# Patient Record
Sex: MALE | Race: WHITE | Employment: OTHER | URBAN - METROPOLITAN AREA
[De-identification: names, ages, dates, MRNs, and addresses within clinical notes are randomized per-mention and may not be internally consistent; named-entity substitution may affect disease eponyms.]

---

## 2017-03-10 RX ORDER — CLARITHROMYCIN 500 MG/1
500 TABLET, COATED ORAL 2 TIMES DAILY
Qty: 14 TABLET | Refills: 0 | Status: SHIPPED | OUTPATIENT
Start: 2017-03-10 | End: 2017-03-24

## 2017-03-15 RX ORDER — MELOXICAM 15 MG/1
TABLET ORAL
Qty: 30 TABLET | Refills: 2 | Status: SHIPPED | OUTPATIENT
Start: 2017-03-15 | End: 2020-01-29

## 2017-04-01 RX ORDER — VENLAFAXINE 75 MG/1
TABLET ORAL
Qty: 90 TABLET | Refills: 4 | Status: SHIPPED | OUTPATIENT
Start: 2017-04-01 | End: 2018-07-15 | Stop reason: SDUPTHER

## 2017-06-05 RX ORDER — HYDROCHLOROTHIAZIDE 12.5 MG/1
CAPSULE, GELATIN COATED ORAL
Qty: 60 CAPSULE | Refills: 2 | Status: SHIPPED | OUTPATIENT
Start: 2017-06-05 | End: 2017-12-04 | Stop reason: SDUPTHER

## 2017-12-04 RX ORDER — HYDROCHLOROTHIAZIDE 12.5 MG/1
CAPSULE, GELATIN COATED ORAL
Qty: 60 CAPSULE | Refills: 1 | Status: SHIPPED | OUTPATIENT
Start: 2017-12-04 | End: 2018-04-07 | Stop reason: SDUPTHER

## 2017-12-16 RX ORDER — QUINAPRIL 40 MG/1
TABLET ORAL
Qty: 30 TABLET | Refills: 4 | Status: SHIPPED | OUTPATIENT
Start: 2017-12-16 | End: 2018-05-16 | Stop reason: SDUPTHER

## 2017-12-28 DIAGNOSIS — I10 ESSENTIAL HYPERTENSION: ICD-10-CM

## 2017-12-28 DIAGNOSIS — E78.2 MIXED HYPERLIPIDEMIA: Primary | Chronic | ICD-10-CM

## 2018-01-16 DIAGNOSIS — E78.2 MIXED HYPERLIPIDEMIA: Chronic | ICD-10-CM

## 2018-01-16 DIAGNOSIS — I10 ESSENTIAL HYPERTENSION: ICD-10-CM

## 2018-01-16 LAB
A/G RATIO: 2.1 (ref 1.1–2.2)
ALBUMIN SERPL-MCNC: 5.1 G/DL (ref 3.4–5)
ALP BLD-CCNC: 60 U/L (ref 40–129)
ALT SERPL-CCNC: 26 U/L (ref 10–40)
ANION GAP SERPL CALCULATED.3IONS-SCNC: 14 MMOL/L (ref 3–16)
AST SERPL-CCNC: 33 U/L (ref 15–37)
BASOPHILS ABSOLUTE: 0 K/UL (ref 0–0.2)
BASOPHILS RELATIVE PERCENT: 0.8 %
BILIRUB SERPL-MCNC: 1.1 MG/DL (ref 0–1)
BUN BLDV-MCNC: 13 MG/DL (ref 7–20)
CALCIUM SERPL-MCNC: 9.8 MG/DL (ref 8.3–10.6)
CHLORIDE BLD-SCNC: 98 MMOL/L (ref 99–110)
CHOLESTEROL, TOTAL: 257 MG/DL (ref 0–199)
CO2: 27 MMOL/L (ref 21–32)
CREAT SERPL-MCNC: 0.9 MG/DL (ref 0.8–1.3)
EOSINOPHILS ABSOLUTE: 0.2 K/UL (ref 0–0.6)
EOSINOPHILS RELATIVE PERCENT: 4.2 %
GFR AFRICAN AMERICAN: >60
GFR NON-AFRICAN AMERICAN: >60
GLOBULIN: 2.4 G/DL
GLUCOSE BLD-MCNC: 94 MG/DL (ref 70–99)
HCT VFR BLD CALC: 51.6 % (ref 40.5–52.5)
HDLC SERPL-MCNC: 62 MG/DL (ref 40–60)
HEMOGLOBIN: 17.6 G/DL (ref 13.5–17.5)
LDL CHOLESTEROL CALCULATED: 173 MG/DL
LYMPHOCYTES ABSOLUTE: 1.2 K/UL (ref 1–5.1)
LYMPHOCYTES RELATIVE PERCENT: 24.1 %
MCH RBC QN AUTO: 32.9 PG (ref 26–34)
MCHC RBC AUTO-ENTMCNC: 34.1 G/DL (ref 31–36)
MCV RBC AUTO: 96.4 FL (ref 80–100)
MONOCYTES ABSOLUTE: 0.6 K/UL (ref 0–1.3)
MONOCYTES RELATIVE PERCENT: 12.2 %
NEUTROPHILS ABSOLUTE: 3 K/UL (ref 1.7–7.7)
NEUTROPHILS RELATIVE PERCENT: 58.7 %
PDW BLD-RTO: 13.8 % (ref 12.4–15.4)
PLATELET # BLD: 137 K/UL (ref 135–450)
PMV BLD AUTO: 9.9 FL (ref 5–10.5)
POTASSIUM SERPL-SCNC: 4.2 MMOL/L (ref 3.5–5.1)
RBC # BLD: 5.36 M/UL (ref 4.2–5.9)
SODIUM BLD-SCNC: 139 MMOL/L (ref 136–145)
TOTAL CK: 240 U/L (ref 39–308)
TOTAL PROTEIN: 7.5 G/DL (ref 6.4–8.2)
TRIGL SERPL-MCNC: 110 MG/DL (ref 0–150)
VLDLC SERPL CALC-MCNC: 22 MG/DL
WBC # BLD: 5.1 K/UL (ref 4–11)

## 2018-02-13 RX ORDER — ATORVASTATIN CALCIUM 40 MG/1
TABLET, FILM COATED ORAL
Qty: 30 TABLET | Refills: 4 | Status: SHIPPED | OUTPATIENT
Start: 2018-02-13 | End: 2020-01-29

## 2018-03-02 ENCOUNTER — OFFICE VISIT (OUTPATIENT)
Dept: FAMILY MEDICINE CLINIC | Age: 63
End: 2018-03-02

## 2018-03-02 VITALS
BODY MASS INDEX: 37.53 KG/M2 | OXYGEN SATURATION: 97 % | HEART RATE: 90 BPM | DIASTOLIC BLOOD PRESSURE: 80 MMHG | SYSTOLIC BLOOD PRESSURE: 120 MMHG | WEIGHT: 292.4 LBS | HEIGHT: 74 IN

## 2018-03-02 DIAGNOSIS — E78.2 MIXED HYPERLIPIDEMIA: Chronic | ICD-10-CM

## 2018-03-02 DIAGNOSIS — I10 ESSENTIAL HYPERTENSION: ICD-10-CM

## 2018-03-02 DIAGNOSIS — M17.0 PRIMARY OSTEOARTHRITIS OF BOTH KNEES: ICD-10-CM

## 2018-03-02 DIAGNOSIS — Z01.818 PREOP EXAMINATION: Primary | ICD-10-CM

## 2018-03-02 PROCEDURE — 99214 OFFICE O/P EST MOD 30 MIN: CPT | Performed by: FAMILY MEDICINE

## 2018-03-02 PROCEDURE — 93000 ELECTROCARDIOGRAM COMPLETE: CPT | Performed by: FAMILY MEDICINE

## 2018-03-02 ASSESSMENT — PATIENT HEALTH QUESTIONNAIRE - PHQ9
SUM OF ALL RESPONSES TO PHQ QUESTIONS 1-9: 0
1. LITTLE INTEREST OR PLEASURE IN DOING THINGS: 0
2. FEELING DOWN, DEPRESSED OR HOPELESS: 0
SUM OF ALL RESPONSES TO PHQ9 QUESTIONS 1 & 2: 0

## 2018-03-02 NOTE — PROGRESS NOTES
Occupational History    [employer]      Social History Main Topics    Smoking status: Former Smoker     Quit date: 1/1/1996    Smokeless tobacco: Never Used    Alcohol use 25.2 oz/week     42 Cans of beer per week    Drug use: Unknown    Sexual activity: Yes     Partners: Female     Other Topics Concern    None     Social History Narrative    Happily  and 1 kid at home    Works 32/w    Stress at [employer] to be gone) and home    No exercise     Current Outpatient Prescriptions   Medication Sig Dispense Refill    atorvastatin (LIPITOR) 40 MG tablet TAKE ONE TABLET BY MOUTH DAILY 30 tablet 4    quinapril (ACCUPRIL) 40 MG tablet TAKE ONE TABLET BY MOUTH DAILY 30 tablet 4    hydrochlorothiazide (MICROZIDE) 12.5 MG capsule TAKE ONE CAPSULE BY MOUTH TWICE A DAY 60 capsule 1    venlafaxine (EFFEXOR) 75 MG tablet TAKE ONE TABLET BY MOUTH THREE TIMES A DAY 90 tablet 4    meloxicam (MOBIC) 15 MG tablet TAKE ONE TABLET BY MOUTH DAILY 30 tablet 2    KLOR-CON M20 20 MEQ tablet TAKE ONE TABLET BY MOUTH DAILY 90 tablet 2     No current facility-administered medications for this visit. Current Outpatient Prescriptions on File Prior to Visit   Medication Sig Dispense Refill    atorvastatin (LIPITOR) 40 MG tablet TAKE ONE TABLET BY MOUTH DAILY 30 tablet 4    quinapril (ACCUPRIL) 40 MG tablet TAKE ONE TABLET BY MOUTH DAILY 30 tablet 4    hydrochlorothiazide (MICROZIDE) 12.5 MG capsule TAKE ONE CAPSULE BY MOUTH TWICE A DAY 60 capsule 1    venlafaxine (EFFEXOR) 75 MG tablet TAKE ONE TABLET BY MOUTH THREE TIMES A DAY 90 tablet 4    meloxicam (MOBIC) 15 MG tablet TAKE ONE TABLET BY MOUTH DAILY 30 tablet 2    KLOR-CON M20 20 MEQ tablet TAKE ONE TABLET BY MOUTH DAILY 90 tablet 2     No current facility-administered medications on file prior to visit.       Allergies   Allergen Reactions    Vicodin [Hydrocodone-Acetaminophen] Itching     Creepy crawly sensation     Review of Systems  Constitutional: negative  Eyes: negative  Ears, nose, mouth, throat, and face: negative  Respiratory: negative  Cardiovascular: negative  Gastrointestinal: negative  Genitourinary:negative  Integument/breast: negative  Hematologic/lymphatic: negative  Musculoskeletal:positive for arthralgias  Neurological: negative  Behavioral/Psych: negative  Endocrine: negative  Allergic/Immunologic: negative      Objective:      /80   Pulse 90   Ht 6' 2\" (1.88 m)   Wt 292 lb 6.4 oz (132.6 kg)   SpO2 97%   BMI 37.54 kg/m²     General Appearance:  Alert, cooperative, no distress, appears stated age   Head:  Normocephalic, without obvious abnormality, atraumatic   Eyes:  PERRL, conjunctiva/corneas clear, EOM's intact, fundi benign, both eyes   Ears:  Normal TM's and external ear canals, both ears   Nose: Nares normal, septum midline, mucosa normal, no drainage or sinus tenderness   Throat: Lips, mucosa, and tongue normal; teeth and gums normal   Neck: Supple, symmetrical, trachea midline, no adenopathy, thyroid: not enlarged, symmetric, no tenderness/mass/nodules, no carotid bruit or JVD   Back:   Symmetric, no curvature, ROM normal, no CVA tenderness   Lungs:   Clear to auscultation bilaterally, respirations unlabored   Chest Wall:  No tenderness or deformity   Heart:  Regular rate and rhythm, S1, S2 normal, no murmur, rub or gallop   Abdomen:   Soft, non-tender, bowel sounds active all four quadrants,  no masses, no organomegaly   Genitalia:  Normal male   Rectal:  Normal tone, normal prostate, no masses or tenderness;  guaiac negative stool   Extremities: Extremities normal, atraumatic, no cyanosis or edema   Pulses: 2+ and symmetric   Skin: Skin color, texture, turgor normal, no rashes or lesions   Lymph nodes: Cervical, supraclavicular, and axillary nodes normal   Neurologic: Normal         Predictors of intubation difficulty:   Morbid obesity? no   Anatomically abnormal facies? no   Prominent incisors? no   Receding mandible?  no

## 2018-03-12 ENCOUNTER — TELEPHONE (OUTPATIENT)
Dept: FAMILY MEDICINE CLINIC | Age: 63
End: 2018-03-12

## 2018-03-12 DIAGNOSIS — G47.30 SLEEP APNEA, UNSPECIFIED TYPE: Primary | ICD-10-CM

## 2018-04-09 RX ORDER — HYDROCHLOROTHIAZIDE 12.5 MG/1
CAPSULE, GELATIN COATED ORAL
Qty: 60 CAPSULE | Refills: 0 | Status: SHIPPED | OUTPATIENT
Start: 2018-04-09 | End: 2018-06-09 | Stop reason: SDUPTHER

## 2018-05-17 RX ORDER — QUINAPRIL 40 MG/1
TABLET ORAL
Qty: 30 TABLET | Refills: 3 | Status: SHIPPED | OUTPATIENT
Start: 2018-05-17 | End: 2018-09-16 | Stop reason: SDUPTHER

## 2018-06-09 RX ORDER — HYDROCHLOROTHIAZIDE 12.5 MG/1
CAPSULE, GELATIN COATED ORAL
Qty: 60 CAPSULE | Refills: 0 | Status: SHIPPED | OUTPATIENT
Start: 2018-06-09 | End: 2018-08-12 | Stop reason: SDUPTHER

## 2018-07-15 RX ORDER — VENLAFAXINE 75 MG/1
TABLET ORAL
Qty: 90 TABLET | Refills: 3 | Status: SHIPPED | OUTPATIENT
Start: 2018-07-15 | End: 2019-07-17 | Stop reason: SDUPTHER

## 2018-09-17 RX ORDER — QUINAPRIL 40 MG/1
TABLET ORAL
Qty: 30 TABLET | Refills: 2 | Status: SHIPPED | OUTPATIENT
Start: 2018-09-17 | End: 2018-12-15 | Stop reason: SDUPTHER

## 2018-12-05 ENCOUNTER — TELEPHONE (OUTPATIENT)
Dept: FAMILY MEDICINE CLINIC | Age: 63
End: 2018-12-05

## 2018-12-05 DIAGNOSIS — Z12.11 COLON CANCER SCREENING: Primary | ICD-10-CM

## 2018-12-06 ENCOUNTER — TELEPHONE (OUTPATIENT)
Dept: FAMILY MEDICINE CLINIC | Age: 63
End: 2018-12-06

## 2018-12-06 DIAGNOSIS — Z12.11 SCREENING FOR COLON CANCER: Primary | ICD-10-CM

## 2018-12-10 ENCOUNTER — OFFICE VISIT (OUTPATIENT)
Dept: FAMILY MEDICINE CLINIC | Age: 63
End: 2018-12-10
Payer: COMMERCIAL

## 2018-12-10 VITALS
OXYGEN SATURATION: 97 % | WEIGHT: 291 LBS | RESPIRATION RATE: 16 BRPM | HEIGHT: 74 IN | HEART RATE: 74 BPM | DIASTOLIC BLOOD PRESSURE: 80 MMHG | BODY MASS INDEX: 37.35 KG/M2 | SYSTOLIC BLOOD PRESSURE: 144 MMHG

## 2018-12-10 DIAGNOSIS — Z23 NEEDS FLU SHOT: Primary | ICD-10-CM

## 2018-12-10 DIAGNOSIS — N28.1 RENAL CYST: ICD-10-CM

## 2018-12-10 PROCEDURE — 99213 OFFICE O/P EST LOW 20 MIN: CPT | Performed by: FAMILY MEDICINE

## 2018-12-10 ASSESSMENT — ENCOUNTER SYMPTOMS
ABDOMINAL PAIN: 0
BOWEL INCONTINENCE: 0

## 2018-12-10 NOTE — PROGRESS NOTES
Subjective:     Patient ID:Kyle Landaverde is a 61 y.o. male. Flank Pain   This is a recurrent problem. The current episode started more than 1 year ago. The problem occurs intermittently. The problem is unchanged. The quality of the pain is described as aching. The pain is mild. The pain is worse during the day. The symptoms are aggravated by bending. Pertinent negatives include no abdominal pain, bladder incontinence, bowel incontinence, chest pain, dysuria, fever, headaches, leg pain, numbness, paresis, paresthesias, pelvic pain, perianal numbness, tingling, weakness or weight loss. Risk factors include sedentary lifestyle (renal cysts seen). He has tried nothing for the symptoms. The treatment provided moderate relief. Allergies   Allergen Reactions    Vicodin [Hydrocodone-Acetaminophen] Itching     Creepy crawly sensation       Current Outpatient Prescriptions   Medication Sig Dispense Refill    quinapril (ACCUPRIL) 40 MG tablet TAKE ONE TABLET BY MOUTH DAILY 30 tablet 2    hydrochlorothiazide (MICROZIDE) 12.5 MG capsule TAKE ONE CAPSULE BY MOUTH TWICE A DAY 60 capsule 3    venlafaxine (EFFEXOR) 75 MG tablet TAKE ONE TABLET BY MOUTH THREE TIMES A DAY 90 tablet 3    atorvastatin (LIPITOR) 40 MG tablet TAKE ONE TABLET BY MOUTH DAILY 30 tablet 4    meloxicam (MOBIC) 15 MG tablet TAKE ONE TABLET BY MOUTH DAILY 30 tablet 2    KLOR-CON M20 20 MEQ tablet TAKE ONE TABLET BY MOUTH DAILY 90 tablet 2     No current facility-administered medications for this visit. Past Medical History:   Diagnosis Date    Abnormal liver enzymes     Anxiety     followed by psych    Chronic back pain     Depression     followed by psych    Gilbert's syndrome     Hyperlipidemia 10/12/2010    Hypertension 10/12/2010    Unspecified sleep apnea     using mask.      Vitamin D deficiency disease        Past Surgical History:   Procedure Laterality Date    APPENDECTOMY      COLONOSCOPY  2006    Q10    ELBOW

## 2018-12-11 ENCOUNTER — HOSPITAL ENCOUNTER (OUTPATIENT)
Dept: ULTRASOUND IMAGING | Age: 63
Discharge: HOME | End: 2018-12-11
Payer: COMMERCIAL

## 2018-12-11 DIAGNOSIS — N28.1 RENAL CYST: ICD-10-CM

## 2018-12-11 PROCEDURE — 76770 US EXAM ABDO BACK WALL COMP: CPT

## 2018-12-16 RX ORDER — QUINAPRIL 40 MG/1
TABLET ORAL
Qty: 30 TABLET | Refills: 1 | Status: SHIPPED | OUTPATIENT
Start: 2018-12-16 | End: 2019-02-13 | Stop reason: SDUPTHER

## 2019-02-13 RX ORDER — QUINAPRIL 40 MG/1
TABLET ORAL
Qty: 30 TABLET | Refills: 0 | Status: SHIPPED | OUTPATIENT
Start: 2019-02-13 | End: 2019-03-18 | Stop reason: SDUPTHER

## 2019-03-18 RX ORDER — QUINAPRIL 40 MG/1
TABLET ORAL
Qty: 30 TABLET | Refills: 5 | Status: SHIPPED | OUTPATIENT
Start: 2019-03-18 | End: 2019-09-16 | Stop reason: SDUPTHER

## 2019-04-14 RX ORDER — HYDROCHLOROTHIAZIDE 12.5 MG/1
CAPSULE, GELATIN COATED ORAL
Qty: 60 CAPSULE | Refills: 2 | Status: SHIPPED | OUTPATIENT
Start: 2019-04-14 | End: 2019-10-11 | Stop reason: SDUPTHER

## 2019-07-17 RX ORDER — VENLAFAXINE 75 MG/1
TABLET ORAL
Qty: 90 TABLET | Refills: 2 | Status: SHIPPED | OUTPATIENT
Start: 2019-07-17 | End: 2020-04-28

## 2019-09-16 RX ORDER — QUINAPRIL 40 MG/1
TABLET ORAL
Qty: 30 TABLET | Refills: 4 | Status: SHIPPED | OUTPATIENT
Start: 2019-09-16 | End: 2020-02-10

## 2019-10-11 RX ORDER — HYDROCHLOROTHIAZIDE 12.5 MG/1
CAPSULE, GELATIN COATED ORAL
Qty: 60 CAPSULE | Refills: 1 | Status: SHIPPED | OUTPATIENT
Start: 2019-10-11 | End: 2020-01-06

## 2020-01-06 RX ORDER — HYDROCHLOROTHIAZIDE 12.5 MG/1
CAPSULE, GELATIN COATED ORAL
Qty: 60 CAPSULE | Refills: 0 | Status: SHIPPED | OUTPATIENT
Start: 2020-01-06 | End: 2020-02-05

## 2020-01-29 ENCOUNTER — OFFICE VISIT (OUTPATIENT)
Dept: FAMILY MEDICINE CLINIC | Age: 65
End: 2020-01-29
Payer: COMMERCIAL

## 2020-01-29 ENCOUNTER — HOSPITAL ENCOUNTER (OUTPATIENT)
Age: 65
Discharge: HOME | End: 2020-01-29
Payer: COMMERCIAL

## 2020-01-29 ENCOUNTER — HOSPITAL ENCOUNTER (OUTPATIENT)
Dept: GENERAL RADIOLOGY | Age: 65
Discharge: HOME | End: 2020-01-29
Payer: COMMERCIAL

## 2020-01-29 VITALS
DIASTOLIC BLOOD PRESSURE: 104 MMHG | TEMPERATURE: 98.8 F | HEART RATE: 75 BPM | HEIGHT: 75 IN | WEIGHT: 287 LBS | OXYGEN SATURATION: 98 % | BODY MASS INDEX: 35.68 KG/M2 | SYSTOLIC BLOOD PRESSURE: 150 MMHG

## 2020-01-29 PROCEDURE — 99213 OFFICE O/P EST LOW 20 MIN: CPT | Performed by: FAMILY MEDICINE

## 2020-01-29 PROCEDURE — G8484 FLU IMMUNIZE NO ADMIN: HCPCS | Performed by: FAMILY MEDICINE

## 2020-01-29 PROCEDURE — G8427 DOCREV CUR MEDS BY ELIG CLIN: HCPCS | Performed by: FAMILY MEDICINE

## 2020-01-29 PROCEDURE — 4004F PT TOBACCO SCREEN RCVD TLK: CPT | Performed by: FAMILY MEDICINE

## 2020-01-29 PROCEDURE — G8417 CALC BMI ABV UP PARAM F/U: HCPCS | Performed by: FAMILY MEDICINE

## 2020-01-29 PROCEDURE — 71046 X-RAY EXAM CHEST 2 VIEWS: CPT

## 2020-01-29 PROCEDURE — 3017F COLORECTAL CA SCREEN DOC REV: CPT | Performed by: FAMILY MEDICINE

## 2020-01-29 ASSESSMENT — ENCOUNTER SYMPTOMS
BLURRED VISION: 0
ABDOMINAL PAIN: 0
ORTHOPNEA: 0
SHORTNESS OF BREATH: 0
BOWEL INCONTINENCE: 0

## 2020-01-29 NOTE — PROGRESS NOTES
TAKE ONE TABLET BY MOUTH DAILY 30 tablet 4    venlafaxine (EFFEXOR) 75 MG tablet TAKE ONE TABLET BY MOUTH THREE TIMES A DAY 90 tablet 2     No current facility-administered medications for this visit. Past Medical History:   Diagnosis Date    Abnormal liver enzymes     Anxiety     followed by psych    Chronic back pain     Depression     followed by psych    Gilbert's syndrome     Hyperlipidemia 10/12/2010    Hypertension 10/12/2010    Unspecified sleep apnea     using mask.  Vitamin D deficiency disease        Past Surgical History:   Procedure Laterality Date    APPENDECTOMY      COLONOSCOPY      Q10    ELBOW SURGERY      reconstruction    HERNIA REPAIR      Hiatal    KNEE ARTHROSCOPY         Social History     Socioeconomic History    Marital status:      Spouse name: Not on file    Number of children: 3    Years of education: Not on file    Highest education level: Not on file   Occupational History    Occupation: [employer]   Social Needs    Financial resource strain: Not on file    Food insecurity:     Worry: Not on file     Inability: Not on file   infirst Healthcare needs:     Medical: Not on file     Non-medical: Not on file   Tobacco Use    Smoking status: Current Every Day Smoker     Packs/day: 0.70     Types: Cigarettes     Last attempt to quit: 1996     Years since quittin.0    Smokeless tobacco: Never Used    Tobacco comment: started smoking 1 1/2 ago, smokes 6-7 cig daily   Substance and Sexual Activity    Alcohol use:  Yes     Alcohol/week: 42.0 standard drinks     Types: 42 Cans of beer per week    Drug use: Yes     Types: Marijuana     Comment: last used yesterday    Sexual activity: Yes     Partners: Female   Lifestyle    Physical activity:     Days per week: Not on file     Minutes per session: Not on file    Stress: Not on file   Relationships    Social connections:     Talks on phone: Not on file     Gets together: Not on file No JVD. Trachea: No tracheal deviation. Cardiovascular:      Rate and Rhythm: Normal rate and regular rhythm. Heart sounds: Normal heart sounds. No murmur. No gallop. Pulmonary:      Effort: Pulmonary effort is normal. No respiratory distress. Breath sounds: Normal breath sounds. No stridor. No wheezing or rales. Chest:      Chest wall: No tenderness. Abdominal:      General: Bowel sounds are normal. There is no distension. Palpations: Abdomen is soft. There is no mass. Tenderness: There is no abdominal tenderness. Musculoskeletal:         General: No tenderness. Lymphadenopathy:      Cervical: No cervical adenopathy. Skin:     General: Skin is warm and dry. Coloration: Skin is not pale. Findings: No erythema or rash. Neurological:      Mental Status: He is alert and oriented to person, place, and time. Cranial Nerves: No cranial nerve deficit. Motor: No abnormal muscle tone. Coordination: Coordination normal.      Deep Tendon Reflexes: Reflexes normal.   Psychiatric:         Behavior: Behavior normal.         Thought Content: Thought content normal.         Judgment: Judgment normal.       No visits with results within 1 Month(s) from this visit.    Latest known visit with results is:   Orders Only on 01/16/2018   Component Date Value Ref Range Status    Sodium 01/16/2018 139  136 - 145 mmol/L Final    Potassium 01/16/2018 4.2  3.5 - 5.1 mmol/L Final    Chloride 01/16/2018 98* 99 - 110 mmol/L Final    CO2 01/16/2018 27  21 - 32 mmol/L Final    Anion Gap 01/16/2018 14  3 - 16 Final    Glucose 01/16/2018 94  70 - 99 mg/dL Final    BUN 01/16/2018 13  7 - 20 mg/dL Final    CREATININE 01/16/2018 0.9  0.8 - 1.3 mg/dL Final    GFR Non- 01/16/2018 >60  >60 Final    GFR  01/16/2018 >60  >60 Final    Calcium 01/16/2018 9.8  8.3 - 10.6 mg/dL Final    Total Protein 01/16/2018 7.5  6.4 - 8.2 g/dL Final    Alb 01/16/2018 5.1* 3.4 - 5.0 g/dL Final    Albumin/Globulin Ratio 01/16/2018 2.1  1.1 - 2.2 Final    Total Bilirubin 01/16/2018 1.1* 0.0 - 1.0 mg/dL Final    Alkaline Phosphatase 01/16/2018 60  40 - 129 U/L Final    ALT 01/16/2018 26  10 - 40 U/L Final    AST 01/16/2018 33  15 - 37 U/L Final    Globulin 01/16/2018 2.4  g/dL Final    WBC 01/16/2018 5.1  4.0 - 11.0 K/uL Final    RBC 01/16/2018 5.36  4.20 - 5.90 M/uL Final    Hemoglobin 01/16/2018 17.6* 13.5 - 17.5 g/dL Final    Hematocrit 01/16/2018 51.6  40.5 - 52.5 % Final    MCV 01/16/2018 96.4  80.0 - 100.0 fL Final    MCH 01/16/2018 32.9  26.0 - 34.0 pg Final    MCHC 01/16/2018 34.1  31.0 - 36.0 g/dL Final    RDW 01/16/2018 13.8  12.4 - 15.4 % Final    Platelets 54/95/0719 137  135 - 450 K/uL Final    MPV 01/16/2018 9.9  5.0 - 10.5 fL Final    Neutrophils % 01/16/2018 58.7  % Final    Lymphocytes % 01/16/2018 24.1  % Final    Monocytes % 01/16/2018 12.2  % Final    Eosinophils % 01/16/2018 4.2  % Final    Basophils % 01/16/2018 0.8  % Final    Neutrophils Absolute 01/16/2018 3.0  1.7 - 7.7 K/uL Final    Lymphocytes Absolute 01/16/2018 1.2  1.0 - 5.1 K/uL Final    Monocytes Absolute 01/16/2018 0.6  0.0 - 1.3 K/uL Final    Eosinophils Absolute 01/16/2018 0.2  0.0 - 0.6 K/uL Final    Basophils Absolute 01/16/2018 0.0  0.0 - 0.2 K/uL Final    Cholesterol, Total 01/16/2018 257* 0 - 199 mg/dL Final    Triglycerides 01/16/2018 110  0 - 150 mg/dL Final    HDL 01/16/2018 62* 40 - 60 mg/dL Final    LDL Calculated 01/16/2018 173* <100 mg/dL Final    VLDL Cholesterol Calculated 01/16/2018 22  Not Established mg/dL Final    Total CK 01/16/2018 240  39 - 308 U/L Final         Assessment and Plan:     Renal cyst  Still has sx--zak recheck U/S    Essential hypertension  Close to goal--watch at home

## 2020-01-30 DIAGNOSIS — I10 ESSENTIAL HYPERTENSION: ICD-10-CM

## 2020-01-30 LAB
A/G RATIO: 2.1 (ref 1.1–2.2)
ALBUMIN SERPL-MCNC: 4.7 G/DL (ref 3.4–5)
ALP BLD-CCNC: 54 U/L (ref 40–129)
ALT SERPL-CCNC: 30 U/L (ref 10–40)
ANION GAP SERPL CALCULATED.3IONS-SCNC: 13 MMOL/L (ref 3–16)
AST SERPL-CCNC: 34 U/L (ref 15–37)
BASOPHILS ABSOLUTE: 0 K/UL (ref 0–0.2)
BASOPHILS RELATIVE PERCENT: 0.7 %
BILIRUB SERPL-MCNC: 1 MG/DL (ref 0–1)
BUN BLDV-MCNC: 11 MG/DL (ref 7–20)
CALCIUM SERPL-MCNC: 9.6 MG/DL (ref 8.3–10.6)
CHLORIDE BLD-SCNC: 103 MMOL/L (ref 99–110)
CHOLESTEROL, TOTAL: 223 MG/DL (ref 0–199)
CO2: 27 MMOL/L (ref 21–32)
CREAT SERPL-MCNC: 0.9 MG/DL (ref 0.8–1.3)
EOSINOPHILS ABSOLUTE: 0.2 K/UL (ref 0–0.6)
EOSINOPHILS RELATIVE PERCENT: 4.4 %
GFR AFRICAN AMERICAN: >60
GFR NON-AFRICAN AMERICAN: >60
GLOBULIN: 2.2 G/DL
GLUCOSE BLD-MCNC: 105 MG/DL (ref 70–99)
HCT VFR BLD CALC: 50.9 % (ref 40.5–52.5)
HDLC SERPL-MCNC: 70 MG/DL (ref 40–60)
HEMOGLOBIN: 17.3 G/DL (ref 13.5–17.5)
LDL CHOLESTEROL CALCULATED: 141 MG/DL
LYMPHOCYTES ABSOLUTE: 1 K/UL (ref 1–5.1)
LYMPHOCYTES RELATIVE PERCENT: 16.9 %
MCH RBC QN AUTO: 32.4 PG (ref 26–34)
MCHC RBC AUTO-ENTMCNC: 34 G/DL (ref 31–36)
MCV RBC AUTO: 95.4 FL (ref 80–100)
MONOCYTES ABSOLUTE: 0.6 K/UL (ref 0–1.3)
MONOCYTES RELATIVE PERCENT: 11 %
NEUTROPHILS ABSOLUTE: 3.8 K/UL (ref 1.7–7.7)
NEUTROPHILS RELATIVE PERCENT: 67 %
PDW BLD-RTO: 13.8 % (ref 12.4–15.4)
PLATELET # BLD: 149 K/UL (ref 135–450)
PMV BLD AUTO: 9.2 FL (ref 5–10.5)
POTASSIUM SERPL-SCNC: 4.6 MMOL/L (ref 3.5–5.1)
RBC # BLD: 5.34 M/UL (ref 4.2–5.9)
SODIUM BLD-SCNC: 143 MMOL/L (ref 136–145)
TOTAL PROTEIN: 6.9 G/DL (ref 6.4–8.2)
TRIGL SERPL-MCNC: 61 MG/DL (ref 0–150)
VLDLC SERPL CALC-MCNC: 12 MG/DL
WBC # BLD: 5.7 K/UL (ref 4–11)

## 2020-01-31 ENCOUNTER — HOSPITAL ENCOUNTER (OUTPATIENT)
Dept: ULTRASOUND IMAGING | Age: 65
Discharge: HOME | End: 2020-01-31
Payer: COMMERCIAL

## 2020-01-31 PROCEDURE — 76700 US EXAM ABDOM COMPLETE: CPT

## 2020-02-04 ENCOUNTER — HOSPITAL ENCOUNTER (OUTPATIENT)
Dept: CT IMAGING | Age: 65
Discharge: HOME | End: 2020-02-04
Payer: COMMERCIAL

## 2020-02-04 PROCEDURE — 74177 CT ABD & PELVIS W/CONTRAST: CPT

## 2020-02-04 PROCEDURE — 6360000004 HC RX CONTRAST MEDICATION: Performed by: FAMILY MEDICINE

## 2020-02-04 RX ADMIN — IOPAMIDOL 80 ML: 755 INJECTION, SOLUTION INTRAVENOUS at 11:59

## 2020-02-05 RX ORDER — HYDROCHLOROTHIAZIDE 12.5 MG/1
CAPSULE, GELATIN COATED ORAL
Qty: 60 CAPSULE | Refills: 0 | Status: SHIPPED | OUTPATIENT
Start: 2020-02-05 | End: 2020-05-09

## 2020-02-10 RX ORDER — QUINAPRIL 40 MG/1
TABLET ORAL
Qty: 30 TABLET | Refills: 3 | Status: SHIPPED | OUTPATIENT
Start: 2020-02-10 | End: 2020-06-07

## 2020-04-28 RX ORDER — VENLAFAXINE 75 MG/1
TABLET ORAL
Qty: 90 TABLET | Refills: 1 | Status: SHIPPED | OUTPATIENT
Start: 2020-04-28 | End: 2020-10-22

## 2020-06-07 RX ORDER — QUINAPRIL 40 MG/1
TABLET ORAL
Qty: 30 TABLET | Refills: 2 | Status: SHIPPED | OUTPATIENT
Start: 2020-06-07 | End: 2020-09-07

## 2020-09-07 RX ORDER — QUINAPRIL 40 MG/1
TABLET ORAL
Qty: 30 TABLET | Refills: 1 | Status: SHIPPED | OUTPATIENT
Start: 2020-09-07 | End: 2020-11-05

## 2020-10-22 RX ORDER — VENLAFAXINE 75 MG/1
TABLET ORAL
Qty: 90 TABLET | Refills: 0 | Status: SHIPPED | OUTPATIENT
Start: 2020-10-22 | End: 2021-01-20

## 2020-11-05 RX ORDER — QUINAPRIL 40 MG/1
TABLET ORAL
Qty: 30 TABLET | Refills: 0 | Status: SHIPPED | OUTPATIENT
Start: 2020-11-05 | End: 2020-11-07

## 2020-11-07 RX ORDER — QUINAPRIL 40 MG/1
TABLET ORAL
Qty: 30 TABLET | Refills: 0 | Status: SHIPPED | OUTPATIENT
Start: 2020-11-07 | End: 2020-12-30

## 2020-12-02 RX ORDER — HYDROCHLOROTHIAZIDE 12.5 MG/1
CAPSULE, GELATIN COATED ORAL
Qty: 60 CAPSULE | Refills: 2 | Status: SHIPPED | OUTPATIENT
Start: 2020-12-02 | End: 2021-05-04

## 2021-03-02 ENCOUNTER — IMMUNIZATION (OUTPATIENT)
Dept: PRIMARY CARE CLINIC | Age: 66
End: 2021-03-02
Payer: MEDICARE

## 2021-03-02 PROCEDURE — 91300 COVID-19, PFIZER VACCINE 30MCG/0.3ML DOSE: CPT | Performed by: FAMILY MEDICINE

## 2021-03-02 PROCEDURE — 0001A COVID-19, PFIZER VACCINE 30MCG/0.3ML DOSE: CPT | Performed by: FAMILY MEDICINE

## 2021-03-03 RX ORDER — QUINAPRIL 40 MG/1
TABLET ORAL
Qty: 90 TABLET | Refills: 3 | Status: SHIPPED | OUTPATIENT
Start: 2021-03-03 | End: 2022-03-30

## 2021-03-23 ENCOUNTER — IMMUNIZATION (OUTPATIENT)
Dept: PRIMARY CARE CLINIC | Age: 66
End: 2021-03-23
Payer: MEDICARE

## 2021-03-23 PROCEDURE — 91300 COVID-19, PFIZER VACCINE 30MCG/0.3ML DOSE: CPT | Performed by: NURSE PRACTITIONER

## 2021-03-23 PROCEDURE — 0002A COVID-19, PFIZER VACCINE 30MCG/0.3ML DOSE: CPT | Performed by: NURSE PRACTITIONER

## 2021-04-30 ENCOUNTER — NURSE TRIAGE (OUTPATIENT)
Dept: OTHER | Facility: CLINIC | Age: 66
End: 2021-04-30

## 2021-04-30 ENCOUNTER — OFFICE VISIT (OUTPATIENT)
Dept: FAMILY MEDICINE CLINIC | Age: 66
End: 2021-04-30
Payer: MEDICARE

## 2021-04-30 VITALS
WEIGHT: 292 LBS | DIASTOLIC BLOOD PRESSURE: 102 MMHG | SYSTOLIC BLOOD PRESSURE: 152 MMHG | HEIGHT: 74 IN | BODY MASS INDEX: 37.47 KG/M2 | HEART RATE: 72 BPM | OXYGEN SATURATION: 98 %

## 2021-04-30 DIAGNOSIS — R42 VERTIGO: ICD-10-CM

## 2021-04-30 DIAGNOSIS — I10 ESSENTIAL HYPERTENSION: ICD-10-CM

## 2021-04-30 DIAGNOSIS — Z23 NEED FOR PNEUMOCOCCAL VACCINATION: Primary | ICD-10-CM

## 2021-04-30 PROCEDURE — 3017F COLORECTAL CA SCREEN DOC REV: CPT | Performed by: FAMILY MEDICINE

## 2021-04-30 PROCEDURE — 99213 OFFICE O/P EST LOW 20 MIN: CPT | Performed by: FAMILY MEDICINE

## 2021-04-30 PROCEDURE — G8417 CALC BMI ABV UP PARAM F/U: HCPCS | Performed by: FAMILY MEDICINE

## 2021-04-30 PROCEDURE — G8427 DOCREV CUR MEDS BY ELIG CLIN: HCPCS | Performed by: FAMILY MEDICINE

## 2021-04-30 PROCEDURE — 4040F PNEUMOC VAC/ADMIN/RCVD: CPT | Performed by: FAMILY MEDICINE

## 2021-04-30 PROCEDURE — 90732 PPSV23 VACC 2 YRS+ SUBQ/IM: CPT | Performed by: FAMILY MEDICINE

## 2021-04-30 PROCEDURE — 1123F ACP DISCUSS/DSCN MKR DOCD: CPT | Performed by: FAMILY MEDICINE

## 2021-04-30 PROCEDURE — 4004F PT TOBACCO SCREEN RCVD TLK: CPT | Performed by: FAMILY MEDICINE

## 2021-04-30 PROCEDURE — G0009 ADMIN PNEUMOCOCCAL VACCINE: HCPCS | Performed by: FAMILY MEDICINE

## 2021-04-30 RX ORDER — AMLODIPINE BESYLATE 5 MG/1
5 TABLET ORAL DAILY
Qty: 30 TABLET | Refills: 3 | Status: SHIPPED | OUTPATIENT
Start: 2021-04-30 | End: 2021-08-24

## 2021-04-30 ASSESSMENT — ENCOUNTER SYMPTOMS
CHANGE IN BOWEL HABIT: 0
VOMITING: 0
VISUAL CHANGE: 0
COUGH: 0
NAUSEA: 0
SORE THROAT: 0
ABDOMINAL PAIN: 0
SWOLLEN GLANDS: 0

## 2021-04-30 NOTE — TELEPHONE ENCOUNTER
Reason for Disposition   Patient wants to be seen    Answer Assessment - Initial Assessment Questions  1. DESCRIPTION: \"Describe your dizziness. \"      4/29/21 pt was walking, went to reach up, but was unable to move arm, reports that for a second he knew something was wrong, but the next thing he knew he was laying on the floor. 2. LIGHTHEADED: \"Do you feel lightheaded? \" (e.g., somewhat faint, woozy, weak upon standing)   when he stood up from chair, he felt fine, it occurred when he looked up at the ceiling, that is when it occurred    3. VERTIGO: \"Do you feel like either you or the room is spinning or tilting? \" (i.e. vertigo)  none  4. SEVERITY: \"How bad is it? \"  \"Do you feel like you are going to faint? \" \"Can you stand and walk? \"    - MILD - walking normally    - MODERATE - interferes with normal activities (e.g., work, school)     - SEVERE - unable to stand, requires support to walk, feels like passing out now.    walking normally. 5. ONSET:  \"When did the dizziness begin? \"   it occurred on 4/29. Has not occurred again. 6. AGGRAVATING FACTORS: \"Does anything make it worse? \" (e.g., standing, change in head position)  Pt believes that looking up at the ceiling at that moment was an aggrivating factor    7. HEART RATE: \"Can you tell me your heart rate? \" \"How many beats in 15 seconds? \"  (Note: not all patients can do this)    Pt reports that he does not feel like his heart is racing. @ 0937 HR - 80    8. CAUSE: \"What do you think is causing the dizziness? \"  Pt believes that it was because he looked up while walking. 9. RECURRENT SYMPTOM: \"Have you had dizziness before? \" If so, ask: \"When was the last time? \" \"What happened that time? \"   Has not reoccurred since yesterday    10. OTHER SYMPTOMS: \"Do you have any other symptoms? \" (e.g., fever, chest pain, vomiting, diarrhea, bleeding)   Pt reports no other symptoms    11. PREGNANCY: \"Is there any chance you are pregnant? \" \"When was your last menstrual period? \"   n/a    Protocols used: DIZZINESS-ADULT-[place]    Received call from Beverley Ponce at [facility]) Jori with Red Flag Complaint. Brief description of triage: pt reported dizziness and collapse to floor that occurred on 4/29/21. Triage indicates for patient to be seen by Provider today. Care advice provided, patient verbalizes understanding; denies any other questions or concerns; instructed to call back for any new or worsening symptoms. Writer provided warm transfer to Raza at [facility] for appointment scheduling. Attention Provider: Thank you for allowing me to participate in the care of your patient. The patient was connected to triage in response to information provided to the [facility]. Please do not respond through this encounter as the response is not directed to a shared pool.

## 2021-04-30 NOTE — PROGRESS NOTES
Subjective:     Patient ID:Kyle Cheung is a 72 y.o. male. Dizziness  This is a recurrent problem. The current episode started 1 to 4 weeks ago. The problem occurs constantly. The problem has been waxing and waning. Associated symptoms include neck pain (and stiffness). Pertinent negatives include no abdominal pain, anorexia, arthralgias, change in bowel habit, chest pain, chills, congestion, coughing, diaphoresis, fatigue, fever, headaches, joint swelling, myalgias, nausea, numbness, rash, sore throat, swollen glands, urinary symptoms, vertigo, visual change, vomiting or weakness. Associated symptoms comments: Started after looking up. Nothing aggravates the symptoms. He has tried nothing for the symptoms. The treatment provided no relief. Allergies   Allergen Reactions    Vicodin [Hydrocodone-Acetaminophen] Itching     Creepy crawly sensation       Current Outpatient Medications   Medication Sig Dispense Refill    quinapril (ACCUPRIL) 40 MG tablet TAKE ONE TABLET BY MOUTH DAILY 90 tablet 3    venlafaxine (EFFEXOR) 75 MG tablet TAKE ONE TABLET BY MOUTH THREE TIMES A DAY 90 tablet 3    hydroCHLOROthiazide (MICROZIDE) 12.5 MG capsule TAKE ONE CAPSULE BY MOUTH TWICE A DAY 60 capsule 2     No current facility-administered medications for this visit. Past Medical History:   Diagnosis Date    Abnormal liver enzymes     Anxiety     followed by psych    Chronic back pain     Depression     followed by psych    Gilbert's syndrome     Hyperlipidemia 10/12/2010    Hypertension 10/12/2010    Unspecified sleep apnea     using mask.      Vitamin D deficiency disease        Past Surgical History:   Procedure Laterality Date    APPENDECTOMY      COLONOSCOPY  2006    Q10    ELBOW SURGERY      reconstruction    HERNIA REPAIR      Hiatal    KNEE ARTHROSCOPY         Social History     Socioeconomic History    Marital status:      Spouse name: Not on file    Number of children: 3    Years History   Administered Date(s) Administered    COVID-19, Pfizer, PF, 30mcg/0.3mL 03/02/2021, 03/23/2021    Influenza Virus Vaccine 12/01/2019    Tdap (Boostrix, Adacel) 04/03/2013    Zoster Live (Zostavax) 06/30/2016       Review of Systems  Review of Systems   Constitutional: Negative for chills, diaphoresis, fatigue and fever. HENT: Negative for congestion and sore throat. Respiratory: Negative for cough. Cardiovascular: Negative for chest pain. Gastrointestinal: Negative for abdominal pain, anorexia, change in bowel habit, nausea and vomiting. Musculoskeletal: Positive for neck pain (and stiffness). Negative for arthralgias, joint swelling and myalgias. Skin: Negative for rash. Neurological: Positive for dizziness. Negative for vertigo, weakness, numbness and headaches. Objective:   Physical Exam  Physical Exam  Vitals signs reviewed. Constitutional:       General: He is not in acute distress. Appearance: He is well-developed. Eyes:      Conjunctiva/sclera: Conjunctivae normal.      Pupils: Pupils are equal, round, and reactive to light. Neck:      Thyroid: No thyromegaly. Vascular: No JVD. Trachea: No tracheal deviation. Cardiovascular:      Rate and Rhythm: Normal rate and regular rhythm. Heart sounds: Normal heart sounds. No murmur. No gallop. Comments: /100  Pulmonary:      Effort: Pulmonary effort is normal. No respiratory distress. Breath sounds: Normal breath sounds. No stridor. No wheezing or rales. Chest:      Chest wall: No tenderness. Abdominal:      General: Bowel sounds are normal. There is no distension. Palpations: Abdomen is soft. There is no mass. Tenderness: There is no abdominal tenderness. Musculoskeletal:         General: No tenderness. Lymphadenopathy:      Cervical: No cervical adenopathy. Skin:     General: Skin is warm and dry. Coloration: Skin is not pale. Findings: No erythema or rash. Neurological:      Mental Status: He is alert and oriented to person, place, and time. Cranial Nerves: No cranial nerve deficit. Motor: No abnormal muscle tone. Coordination: Coordination normal.      Deep Tendon Reflexes: Reflexes normal.   Psychiatric:         Behavior: Behavior normal.         Thought Content:  Thought content normal.         Judgment: Judgment normal.         Assessment and Plan:     Essential hypertension   Uncontrolled, changes made today: add norvasc    Vertigo   Uncontrolled, changes made today: get dopplers

## 2021-05-03 ENCOUNTER — HOSPITAL ENCOUNTER (OUTPATIENT)
Dept: VASCULAR LAB | Age: 66
Discharge: HOME | End: 2021-05-03
Payer: MEDICARE

## 2021-05-03 DIAGNOSIS — R42 VERTIGO: ICD-10-CM

## 2021-05-03 PROCEDURE — 93880 EXTRACRANIAL BILAT STUDY: CPT

## 2021-05-04 DIAGNOSIS — I10 ESSENTIAL HYPERTENSION: ICD-10-CM

## 2021-05-04 DIAGNOSIS — E78.2 MIXED HYPERLIPIDEMIA: Primary | ICD-10-CM

## 2021-05-04 PROBLEM — I65.23 BILATERAL CAROTID ARTERY STENOSIS: Status: ACTIVE | Noted: 2021-05-04

## 2021-05-04 RX ORDER — HYDROCHLOROTHIAZIDE 12.5 MG/1
CAPSULE, GELATIN COATED ORAL
Qty: 60 CAPSULE | Refills: 1 | Status: SHIPPED | OUTPATIENT
Start: 2021-05-04 | End: 2021-07-01

## 2021-05-06 DIAGNOSIS — E78.2 MIXED HYPERLIPIDEMIA: ICD-10-CM

## 2021-05-06 DIAGNOSIS — I10 ESSENTIAL HYPERTENSION: ICD-10-CM

## 2021-05-06 LAB
A/G RATIO: 1.9 (ref 1.1–2.2)
ALBUMIN SERPL-MCNC: 4.7 G/DL (ref 3.4–5)
ALP BLD-CCNC: 57 U/L (ref 40–129)
ALT SERPL-CCNC: 46 U/L (ref 10–40)
ANION GAP SERPL CALCULATED.3IONS-SCNC: 15 MMOL/L (ref 3–16)
AST SERPL-CCNC: 40 U/L (ref 15–37)
BASOPHILS ABSOLUTE: 0 K/UL (ref 0–0.2)
BASOPHILS RELATIVE PERCENT: 0.6 %
BILIRUB SERPL-MCNC: 1.5 MG/DL (ref 0–1)
BUN BLDV-MCNC: 14 MG/DL (ref 7–20)
CALCIUM SERPL-MCNC: 9.7 MG/DL (ref 8.3–10.6)
CHLORIDE BLD-SCNC: 96 MMOL/L (ref 99–110)
CHOLESTEROL, TOTAL: 245 MG/DL (ref 0–199)
CO2: 26 MMOL/L (ref 21–32)
CREAT SERPL-MCNC: 0.9 MG/DL (ref 0.8–1.3)
EOSINOPHILS ABSOLUTE: 0.2 K/UL (ref 0–0.6)
EOSINOPHILS RELATIVE PERCENT: 3.2 %
GFR AFRICAN AMERICAN: >60
GFR NON-AFRICAN AMERICAN: >60
GLOBULIN: 2.5 G/DL
GLUCOSE BLD-MCNC: 85 MG/DL (ref 70–99)
HCT VFR BLD CALC: 50.4 % (ref 40.5–52.5)
HDLC SERPL-MCNC: 69 MG/DL (ref 40–60)
HEMOGLOBIN: 17.1 G/DL (ref 13.5–17.5)
LDL CHOLESTEROL CALCULATED: 163 MG/DL
LYMPHOCYTES ABSOLUTE: 1 K/UL (ref 1–5.1)
LYMPHOCYTES RELATIVE PERCENT: 17 %
MCH RBC QN AUTO: 32.3 PG (ref 26–34)
MCHC RBC AUTO-ENTMCNC: 33.9 G/DL (ref 31–36)
MCV RBC AUTO: 95.3 FL (ref 80–100)
MONOCYTES ABSOLUTE: 0.8 K/UL (ref 0–1.3)
MONOCYTES RELATIVE PERCENT: 13.3 %
NEUTROPHILS ABSOLUTE: 4 K/UL (ref 1.7–7.7)
NEUTROPHILS RELATIVE PERCENT: 65.9 %
PDW BLD-RTO: 13.6 % (ref 12.4–15.4)
PLATELET # BLD: 138 K/UL (ref 135–450)
PMV BLD AUTO: 10 FL (ref 5–10.5)
POTASSIUM SERPL-SCNC: 4.1 MMOL/L (ref 3.5–5.1)
RBC # BLD: 5.29 M/UL (ref 4.2–5.9)
SODIUM BLD-SCNC: 137 MMOL/L (ref 136–145)
TOTAL PROTEIN: 7.2 G/DL (ref 6.4–8.2)
TRIGL SERPL-MCNC: 64 MG/DL (ref 0–150)
VLDLC SERPL CALC-MCNC: 13 MG/DL
WBC # BLD: 6 K/UL (ref 4–11)

## 2021-05-07 DIAGNOSIS — E78.2 MIXED HYPERLIPIDEMIA: Primary | ICD-10-CM

## 2021-05-07 RX ORDER — ROSUVASTATIN CALCIUM 10 MG/1
10 TABLET, COATED ORAL DAILY
Qty: 30 TABLET | Refills: 5 | Status: SHIPPED | OUTPATIENT
Start: 2021-05-07 | End: 2021-07-19

## 2021-05-21 ENCOUNTER — CLINICAL DOCUMENTATION (OUTPATIENT)
Dept: FAMILY MEDICINE CLINIC | Age: 66
End: 2021-05-21

## 2021-05-21 DIAGNOSIS — Z12.11 SCREEN FOR COLON CANCER: Primary | ICD-10-CM

## 2021-07-01 RX ORDER — HYDROCHLOROTHIAZIDE 12.5 MG/1
CAPSULE, GELATIN COATED ORAL
Qty: 60 CAPSULE | Refills: 0 | Status: SHIPPED | OUTPATIENT
Start: 2021-07-01 | End: 2021-12-17

## 2021-07-19 DIAGNOSIS — E78.2 MIXED HYPERLIPIDEMIA: ICD-10-CM

## 2021-07-19 LAB
ALBUMIN SERPL-MCNC: 5.3 G/DL (ref 3.4–5)
ALP BLD-CCNC: 56 U/L (ref 40–129)
ALT SERPL-CCNC: 46 U/L (ref 10–40)
AST SERPL-CCNC: 43 U/L (ref 15–37)
BILIRUB SERPL-MCNC: 1.3 MG/DL (ref 0–1)
BILIRUBIN DIRECT: 0.3 MG/DL (ref 0–0.3)
BILIRUBIN, INDIRECT: 1 MG/DL (ref 0–1)
CHOLESTEROL, TOTAL: 223 MG/DL (ref 0–199)
HDLC SERPL-MCNC: 83 MG/DL (ref 40–60)
LDL CHOLESTEROL CALCULATED: 129 MG/DL
TOTAL CK: 203 U/L (ref 39–308)
TOTAL PROTEIN: 8 G/DL (ref 6.4–8.2)
TRIGL SERPL-MCNC: 55 MG/DL (ref 0–150)
VLDLC SERPL CALC-MCNC: 11 MG/DL

## 2021-07-19 RX ORDER — ROSUVASTATIN CALCIUM 20 MG/1
20 TABLET, COATED ORAL DAILY
Qty: 30 TABLET | Refills: 5 | Status: SHIPPED | OUTPATIENT
Start: 2021-07-19 | End: 2022-01-19

## 2021-07-20 ENCOUNTER — OFFICE VISIT (OUTPATIENT)
Dept: FAMILY MEDICINE CLINIC | Age: 66
End: 2021-07-20
Payer: MEDICARE

## 2021-07-20 VITALS
DIASTOLIC BLOOD PRESSURE: 86 MMHG | BODY MASS INDEX: 38.49 KG/M2 | SYSTOLIC BLOOD PRESSURE: 124 MMHG | HEIGHT: 73 IN | HEART RATE: 91 BPM | OXYGEN SATURATION: 95 % | WEIGHT: 290.4 LBS

## 2021-07-20 DIAGNOSIS — I10 ESSENTIAL HYPERTENSION: ICD-10-CM

## 2021-07-20 DIAGNOSIS — E78.2 MIXED HYPERLIPIDEMIA: ICD-10-CM

## 2021-07-20 PROCEDURE — 4004F PT TOBACCO SCREEN RCVD TLK: CPT | Performed by: FAMILY MEDICINE

## 2021-07-20 PROCEDURE — 99214 OFFICE O/P EST MOD 30 MIN: CPT | Performed by: FAMILY MEDICINE

## 2021-07-20 PROCEDURE — 1123F ACP DISCUSS/DSCN MKR DOCD: CPT | Performed by: FAMILY MEDICINE

## 2021-07-20 PROCEDURE — G8427 DOCREV CUR MEDS BY ELIG CLIN: HCPCS | Performed by: FAMILY MEDICINE

## 2021-07-20 PROCEDURE — 3017F COLORECTAL CA SCREEN DOC REV: CPT | Performed by: FAMILY MEDICINE

## 2021-07-20 PROCEDURE — 4040F PNEUMOC VAC/ADMIN/RCVD: CPT | Performed by: FAMILY MEDICINE

## 2021-07-20 PROCEDURE — G8417 CALC BMI ABV UP PARAM F/U: HCPCS | Performed by: FAMILY MEDICINE

## 2021-07-20 SDOH — ECONOMIC STABILITY: FOOD INSECURITY: WITHIN THE PAST 12 MONTHS, YOU WORRIED THAT YOUR FOOD WOULD RUN OUT BEFORE YOU GOT MONEY TO BUY MORE.: NEVER TRUE

## 2021-07-20 SDOH — ECONOMIC STABILITY: FOOD INSECURITY: WITHIN THE PAST 12 MONTHS, THE FOOD YOU BOUGHT JUST DIDN'T LAST AND YOU DIDN'T HAVE MONEY TO GET MORE.: NEVER TRUE

## 2021-07-20 ASSESSMENT — PATIENT HEALTH QUESTIONNAIRE - PHQ9
SUM OF ALL RESPONSES TO PHQ QUESTIONS 1-9: 0
1. LITTLE INTEREST OR PLEASURE IN DOING THINGS: 0
2. FEELING DOWN, DEPRESSED OR HOPELESS: 0
SUM OF ALL RESPONSES TO PHQ QUESTIONS 1-9: 0
SUM OF ALL RESPONSES TO PHQ QUESTIONS 1-9: 0
SUM OF ALL RESPONSES TO PHQ9 QUESTIONS 1 & 2: 0

## 2021-07-20 ASSESSMENT — SOCIAL DETERMINANTS OF HEALTH (SDOH): HOW HARD IS IT FOR YOU TO PAY FOR THE VERY BASICS LIKE FOOD, HOUSING, MEDICAL CARE, AND HEATING?: NOT HARD AT ALL

## 2021-07-20 ASSESSMENT — ENCOUNTER SYMPTOMS
SHORTNESS OF BREATH: 0
ORTHOPNEA: 0
BLURRED VISION: 0

## 2021-07-20 NOTE — PROGRESS NOTES
Subjective:     Patient ID:Kyle Colón is a 72 y.o. male. Hypertension  This is a recurrent problem. The current episode started more than 1 year ago. The problem has been waxing and waning since onset. The problem is controlled. Pertinent negatives include no anxiety, blurred vision, chest pain, headaches, malaise/fatigue, neck pain, orthopnea, palpitations, peripheral edema, PND, shortness of breath or sweats. Risk factors for coronary artery disease include dyslipidemia and male gender. Past treatments include diuretics and calcium channel blockers. The current treatment provides moderate improvement. There are no compliance problems. There is no history of angina, kidney disease, CAD/MI, CVA, heart failure, left ventricular hypertrophy, PVD or retinopathy. There is no history of chronic renal disease, coarctation of the aorta, hyperaldosteronism, hypercortisolism, hyperparathyroidism, a hypertension causing med, pheochromocytoma, renovascular disease, sleep apnea or a thyroid problem. Allergies   Allergen Reactions    Vicodin [Hydrocodone-Acetaminophen] Itching     Creepy crawly sensation       Current Outpatient Medications   Medication Sig Dispense Refill    rosuvastatin (CRESTOR) 20 MG tablet Take 1 tablet by mouth daily 30 tablet 5    hydroCHLOROthiazide (MICROZIDE) 12.5 MG capsule TAKE ONE CAPSULE BY MOUTH TWICE A DAY 60 capsule 0    amLODIPine (NORVASC) 5 MG tablet Take 1 tablet by mouth daily 30 tablet 3    quinapril (ACCUPRIL) 40 MG tablet TAKE ONE TABLET BY MOUTH DAILY 90 tablet 3    venlafaxine (EFFEXOR) 75 MG tablet TAKE ONE TABLET BY MOUTH THREE TIMES A DAY 90 tablet 3     No current facility-administered medications for this visit.        Past Medical History:   Diagnosis Date    Abnormal liver enzymes     Anxiety     followed by psych    Bilateral carotid artery stenosis 5/4/2021    Chronic back pain     Depression     followed by psych    Gilbert's syndrome     Hyperlipidemia 10/12/2010    Hypertension 10/12/2010    Unspecified sleep apnea     using mask.  Vitamin D deficiency disease        Past Surgical History:   Procedure Laterality Date    APPENDECTOMY      COLONOSCOPY  2006    Q10    ELBOW SURGERY      reconstruction    HERNIA REPAIR      Hiatal    KNEE ARTHROSCOPY         Social History     Socioeconomic History    Marital status:      Spouse name: Not on file    Number of children: 3    Years of education: Not on file    Highest education level: Not on file   Occupational History    Occupation: [employer]   Tobacco Use    Smoking status: Current Every Day Smoker     Packs/day: 0.70     Types: Cigarettes     Last attempt to quit: 1996     Years since quittin.5    Smokeless tobacco: Never Used    Tobacco comment: started smoking 1 1/2 ago, smokes 6-7 cig daily   Substance and Sexual Activity    Alcohol use: Yes     Alcohol/week: 42.0 standard drinks     Types: 42 Cans of beer per week    Drug use: Yes     Types: Marijuana     Comment: last used yesterday    Sexual activity: Yes     Partners: Female   Other Topics Concern    Not on file   Social History Narrative    Happily  and 1 kid at home    Works 32/w    Stress at [place] to be gone) and home    No exercise     Social Determinants of Health     Financial Resource Strain: Low Risk     Difficulty of Paying Living Expenses: Not hard at all   Food Insecurity: No Food Insecurity    Worried About [address] in the Last Year: Never true    [address] [facility] [address] in the Last Year: Never true   Transportation Needs:     Lack of Transportation (Medical):      Lack of Transportation (Non-Medical):    Physical Activity:     Days of Exercise per Week:     Minutes of Exercise per Session:    Stress:     Feeling of Stress :    Social Connections:     Frequency of Communication with Friends and Family:     Frequency of Social Gatherings with Friends and Family:  Attends Sikhism Services:     Active Member of Clubs or Organizations:     Attends Club or Organization Meetings:     Marital Status:    Intimate Partner Violence:     Fear of Current or Ex-Partner:     Emotionally Abused:     Physically Abused:     Sexually Abused:        Family History   Problem Relation Age of Onset    Diabetes Mother          age 76    Coronary Art Dis Mother     Coronary Art Dis Father 48         age 61    Heart Disease Father     Alcohol Abuse Father     High Blood Pressure Brother     Stroke Brother         alcohol       Immunization History   Administered Date(s) Administered    COVID-19, Bryan Peter, PF, 30mcg/0.3mL 2021, 2021    Influenza Virus Vaccine 2019    Pneumococcal Polysaccharide ([id number]) 2021    Tdap (Boostrix, Adacel) 2013    Zoster Live (Zostavax) 2016       Review of Systems  Review of Systems   Constitutional: Negative for malaise/fatigue. Eyes: Negative for blurred vision. Respiratory: Negative for shortness of breath. Cardiovascular: Negative for chest pain, palpitations, orthopnea and PND. Musculoskeletal: Negative for neck pain. Neurological: Negative for headaches. Objective:   Physical Exam  Physical Exam  Vitals reviewed. Constitutional:       General: He is not in acute distress. Appearance: He is well-developed. Eyes:      Conjunctiva/sclera: Conjunctivae normal.      Pupils: Pupils are equal, round, and reactive to light. Neck:      Thyroid: No thyromegaly. Vascular: No JVD. Trachea: No tracheal deviation. Cardiovascular:      Rate and Rhythm: Normal rate and regular rhythm. Heart sounds: Normal heart sounds. No murmur heard. No gallop. Pulmonary:      Effort: Pulmonary effort is normal. No respiratory distress. Breath sounds: Normal breath sounds. No stridor. No wheezing or rales. Chest:      Chest wall: No tenderness.    Abdominal: General: Bowel sounds are normal. There is no distension. Palpations: Abdomen is soft. There is no mass. Tenderness: There is no abdominal tenderness. Musculoskeletal:         General: No tenderness. Lymphadenopathy:      Cervical: No cervical adenopathy. Skin:     General: Skin is warm and dry. Coloration: Skin is not pale. Findings: No erythema or rash. Neurological:      Mental Status: He is alert and oriented to person, place, and time. Cranial Nerves: No cranial nerve deficit. Motor: No abnormal muscle tone. Coordination: Coordination normal.      Deep Tendon Reflexes: Reflexes normal.   Psychiatric:         Behavior: Behavior normal.         Thought Content: Thought content normal.         Judgment: Judgment normal.          1. No acute abdominal or pelvic abnormality. 2. Small fat-containing umbilical hernia. 3. Diffuse diverticulosis without evidence of acute diverticulitis. 3. Mild urinary bladder wall thickening, correlate for possible cystitis.      Orders Only on 07/19/2021   Component Date Value Ref Range Status    Total CK 07/19/2021 203  39 - 308 U/L Final    Total Protein 07/19/2021 8.0  6.4 - 8.2 g/dL Final    Albumin 07/19/2021 5.3* 3.4 - 5.0 g/dL Final    Alkaline Phosphatase 07/19/2021 56  40 - 129 U/L Final    ALT 07/19/2021 46* 10 - 40 U/L Final    AST 07/19/2021 43* 15 - 37 U/L Final    Total Bilirubin 07/19/2021 1.3* 0.0 - 1.0 mg/dL Final    Bilirubin, Direct 07/19/2021 0.3  0.0 - 0.3 mg/dL Final    Bilirubin, Indirect 07/19/2021 1.0  0.0 - 1.0 mg/dL Final    Cholesterol, Total 07/19/2021 223* 0 - 199 mg/dL Final    Triglycerides 07/19/2021 55  0 - 150 mg/dL Final    HDL 07/19/2021 83* 40 - 60 mg/dL Final    LDL Calculated 07/19/2021 129* <100 mg/dL Final    VLDL Cholesterol Calculated 07/19/2021 11  Not Established mg/dL Final         Assessment and Plan:   Essential hypertension   Well-controlled, continue current medications(accupril/amlodipine)    Hyperlipidemia   Uncontrolled, changes made today: ^ crestor to 20

## 2021-08-24 RX ORDER — AMLODIPINE BESYLATE 5 MG/1
TABLET ORAL
Qty: 90 TABLET | Refills: 3 | Status: SHIPPED | OUTPATIENT
Start: 2021-08-24 | End: 2022-08-22

## 2021-08-31 ENCOUNTER — OFFICE VISIT (OUTPATIENT)
Dept: FAMILY MEDICINE CLINIC | Age: 66
End: 2021-08-31
Payer: MEDICARE

## 2021-08-31 VITALS
BODY MASS INDEX: 38.13 KG/M2 | OXYGEN SATURATION: 96 % | DIASTOLIC BLOOD PRESSURE: 84 MMHG | HEART RATE: 78 BPM | SYSTOLIC BLOOD PRESSURE: 128 MMHG | WEIGHT: 289 LBS | TEMPERATURE: 96.8 F

## 2021-08-31 DIAGNOSIS — G47.33 OSA ON CPAP: ICD-10-CM

## 2021-08-31 DIAGNOSIS — E66.01 SEVERE OBESITY (BMI 35.0-35.9 WITH COMORBIDITY) (HCC): ICD-10-CM

## 2021-08-31 DIAGNOSIS — Z01.818 PRE-OP EXAM: ICD-10-CM

## 2021-08-31 DIAGNOSIS — M19.012 PRIMARY OSTEOARTHRITIS OF LEFT SHOULDER: ICD-10-CM

## 2021-08-31 DIAGNOSIS — Z00.00 ROUTINE GENERAL MEDICAL EXAMINATION AT A HEALTH CARE FACILITY: Primary | ICD-10-CM

## 2021-08-31 DIAGNOSIS — I10 ESSENTIAL HYPERTENSION: ICD-10-CM

## 2021-08-31 DIAGNOSIS — Z99.89 OSA ON CPAP: ICD-10-CM

## 2021-08-31 DIAGNOSIS — E78.2 MIXED HYPERLIPIDEMIA: ICD-10-CM

## 2021-08-31 PROCEDURE — 1123F ACP DISCUSS/DSCN MKR DOCD: CPT | Performed by: FAMILY MEDICINE

## 2021-08-31 PROCEDURE — G0438 PPPS, INITIAL VISIT: HCPCS | Performed by: FAMILY MEDICINE

## 2021-08-31 PROCEDURE — 4040F PNEUMOC VAC/ADMIN/RCVD: CPT | Performed by: FAMILY MEDICINE

## 2021-08-31 PROCEDURE — 3017F COLORECTAL CA SCREEN DOC REV: CPT | Performed by: FAMILY MEDICINE

## 2021-08-31 NOTE — PATIENT INSTRUCTIONS
Personalized Preventive Plan for Carlo Villalobos - 8/31/2021  Medicare offers a range of preventive health benefits. Some of the tests and screenings are paid in full while other may be subject to a deductible, co-insurance, and/or copay. Some of these benefits include a comprehensive review of your medical history including lifestyle, illnesses that may run in your family, and various assessments and screenings as appropriate. After reviewing your medical record and screening and assessments performed today your provider may have ordered immunizations, labs, imaging, and/or referrals for you. A list of these orders (if applicable) as well as your Preventive Care list are included within your After Visit Summary for your review. Other Preventive Recommendations:    · A preventive eye exam performed by an eye specialist is recommended every 1-2 years to screen for glaucoma; cataracts, macular degeneration, and other eye disorders. · A preventive dental visit is recommended every 6 months. · Try to get at least 150 minutes of exercise per week or 10,000 steps per day on a pedometer . · Order or download the FREE \"Exercise & Physical Activity: Your Everyday Guide\" from The [employer]. Call 4-895.531.2591 or search The [employer] online. · You need 4571-4461 mg of calcium and 3520-2651 IU of vitamin D per day. It is possible to meet your calcium requirement with diet alone, but a vitamin D supplement is usually necessary to meet this goal.  · When exposed to the sun, use a sunscreen that protects against both UVA and UVB radiation with an SPF of 30 or greater. Reapply every 2 to 3 hours or after sweating, drying off with a towel, or swimming. · Always wear a seat belt when traveling in a car. Always wear a helmet when riding a bicycle or motorcycle.

## 2021-08-31 NOTE — PROGRESS NOTES
Subjective:      Maura Brandt is a 72 y.o. male who presents to the office today for a preoperative consultation at the request of surgeon Dr Rafael Hancock who plans on performing LTSR(severe OA) on September 10. This consultation is requested for the specific conditions prompting preoperative evaluation (i.e. because of potential affect on operative risk): Htn,RAYNA. Planned anesthesia is General.  The patient and family have no known anesthesia issues nor bleeding risks. .  Past Medical History:   Diagnosis Date    Abnormal liver enzymes     Anxiety     followed by psych    Bilateral carotid artery stenosis 2021    Chronic back pain     Depression     followed by psych    Gilbert's syndrome     Hyperlipidemia 10/12/2010    Hypertension 10/12/2010    Unspecified sleep apnea     using mask.      Vitamin D deficiency disease      Patient Active Problem List    Diagnosis Date Noted    Primary osteoarthritis of left shoulder 2021    Pre-op exam 2021    RAYNA on CPAP 2021    Bilateral carotid artery stenosis 2021    Vertigo 2021    Renal cyst 12/10/2018    Alcohol abuse 2016    Insomnia 2014    Fatigue 2014    Bunion of right foot 10/25/2013    Hammer 2nd toe, acquired 10/25/2013    Increased storage iron 2013    Toe pain, right 2012    Primary osteoarthritis of both knees 2010    Hyperlipidemia 10/12/2010    Neck pain 10/12/2010    Depressive disorder, not elsewhere classified 10/09/2008    Essential hypertension 2005     Past Surgical History:   Procedure Laterality Date    APPENDECTOMY      COLONOSCOPY      Q10    ELBOW SURGERY      reconstruction    HERNIA REPAIR      Hiatal    KNEE ARTHROSCOPY       Family History   Problem Relation Age of Onset    Diabetes Mother          age 76    Coronary Art Dis Mother     Coronary Art Dis Father 48         age 59    Heart Disease Father     Alcohol Abuse Father  High Blood Pressure Brother     Stroke Brother         alcohol     Social History     Socioeconomic History    Marital status:      Spouse name: None    Number of children: 3    Years of education: None    Highest education level: None   Occupational History    Occupation: [employer]   Tobacco Use    Smoking status: Former Smoker     Packs/day: 0.70     Types: Cigarettes     Quit date: 1996     Years since quittin.6    Smokeless tobacco: Never Used    Tobacco comment: started smoking 1 1/2 ago, smokes 6-7 cig daily   Substance and Sexual Activity    Alcohol use: Yes     Alcohol/week: 42.0 standard drinks     Types: 42 Cans of beer per week    Drug use: Yes     Types: Marijuana     Comment: last used yesterday    Sexual activity: Yes     Partners: Female   Other Topics Concern    None   Social History Narrative    Happily  and 1 kid at home    Works 32/w    Stress at [employer] to be gone) and home    No exercise     Social Determinants of Health     Financial Resource Strain: Low Risk     Difficulty of Paying Living Expenses: Not hard at all   Food Insecurity: No Food Insecurity    Worried About [address] [employer] in the Last Year: Never true    [facility] in the Last Year: Never true   Transportation Needs:     Lack of Transportation (Medical):      Lack of Transportation (Non-Medical):    Physical Activity:     Days of Exercise per Week:     Minutes of Exercise per Session:    Stress:     Feeling of Stress :    Social Connections:     Frequency of Communication with Friends and Family:     Frequency of Social Gatherings with Friends and Family:     Attends Catholic Services:     Active Member of Clubs or Organizations:     Attends Club or Organization Meetings:     Marital Status:    Intimate Partner Violence:     Fear of Current or Ex-Partner:     Emotionally Abused:     Physically Abused:     Sexually Abused:      Current Outpatient Medications   Medication Sig Dispense Refill    amLODIPine (NORVASC) 5 MG tablet TAKE ONE TABLET BY MOUTH DAILY 90 tablet 3    rosuvastatin (CRESTOR) 20 MG tablet Take 1 tablet by mouth daily 30 tablet 5    hydroCHLOROthiazide (MICROZIDE) 12.5 MG capsule TAKE ONE CAPSULE BY MOUTH TWICE A DAY 60 capsule 0    quinapril (ACCUPRIL) 40 MG tablet TAKE ONE TABLET BY MOUTH DAILY 90 tablet 3    venlafaxine (EFFEXOR) 75 MG tablet TAKE ONE TABLET BY MOUTH THREE TIMES A DAY 90 tablet 3     No current facility-administered medications for this visit. Current Outpatient Medications on File Prior to Visit   Medication Sig Dispense Refill    amLODIPine (NORVASC) 5 MG tablet TAKE ONE TABLET BY MOUTH DAILY 90 tablet 3    rosuvastatin (CRESTOR) 20 MG tablet Take 1 tablet by mouth daily 30 tablet 5    hydroCHLOROthiazide (MICROZIDE) 12.5 MG capsule TAKE ONE CAPSULE BY MOUTH TWICE A DAY 60 capsule 0    quinapril (ACCUPRIL) 40 MG tablet TAKE ONE TABLET BY MOUTH DAILY 90 tablet 3    venlafaxine (EFFEXOR) 75 MG tablet TAKE ONE TABLET BY MOUTH THREE TIMES A DAY 90 tablet 3     No current facility-administered medications on file prior to visit.      Allergies   Allergen Reactions    Vicodin [Hydrocodone-Acetaminophen] Itching     Creepy crawly sensation     Review of Systems  Constitutional: negative  Eyes: negative  Ears, nose, mouth, throat, and face: negative  Respiratory: negative  Cardiovascular: negative  Gastrointestinal: negative  Genitourinary:negative  Integument/breast: negative  Hematologic/lymphatic: negative  Musculoskeletal:positive for arthralgias, back pain and neck pain  Neurological: negative  Behavioral/Psych: negative  Endocrine: negative  Allergic/Immunologic: negative      Objective:      /84 (Site: Right Upper Arm, Position: Sitting, Cuff Size: Large Adult)   Pulse 78   Temp 96.8 °F (36 °C)   Wt 289 lb (131.1 kg)   SpO2 96%   BMI 38.13 kg/m²     General Appearance:  Alert, cooperative, no distress, appears stated age   Head:  Normocephalic, without obvious abnormality, atraumatic   Eyes:  PERRL, conjunctiva/corneas clear, EOM's intact, fundi benign, both eyes   Ears:  Normal TM's and external ear canals, both ears   Nose: Nares normal, septum midline, mucosa normal, no drainage or sinus tenderness   Throat: Lips, mucosa, and tongue normal; teeth and gums normal   Neck: Supple, symmetrical, trachea midline, no adenopathy, thyroid: not enlarged, symmetric, no tenderness/mass/nodules, no carotid bruit or JVD   Back:   Symmetric, no curvature, ROM normal, no CVA tenderness   Lungs:   Clear to auscultation bilaterally, respirations unlabored   Chest Wall:  No tenderness or deformity   Heart:  Regular rate and rhythm, S1, S2 normal, no murmur, rub or gallop   Abdomen:   Soft, non-tender, bowel sounds active all four quadrants,  no masses, no organomegaly   Genitalia:  Normal male   Rectal:  Normal tone, normal prostate, no masses or tenderness;  guaiac negative stool   Extremities: Extremities normal, atraumatic, no cyanosis or edema   Pulses: 2+ and symmetric   Skin: Skin color, texture, turgor normal, no rashes or lesions   Lymph nodes: Cervical, supraclavicular, and axillary nodes normal   Neurologic: Normal         Predictors of intubation difficulty:   Morbid obesity? no   Anatomically abnormal facies? no   Prominent incisors? no   Receding mandible? no   Short, thick neck? no   Neck range of motion: normal   Mallampati score: I (soft palate, uvula, fauces, tonsillar pillars visible)   Thyromental distance: < 6cm   Mouth openincm   Dentition: No chipped, loose, or missing teeth.     Cardiographics  ECG: normal sinus rhythm, no blocks or conduction defects, no ischemic changes          Lab Review   Reviewed K 3.5 and platelets slightly low at 131     Assessment:        72 y.o. male with planned surgery as above--OKd for planned procedure  Known risk factors for perioperative complications: None Difficulty with intubation is not anticipated. Cardiac Risk Estimation: low risk    Current medications which may produce withdrawal symptoms if withheld perioperatively: none       Plan:      1. Preoperative workup as follows none  2. Change in medication regimen before surgery: none, continue med regimen including morning of surgery, w/sip of water  3. Prophylaxis for cardiac events with perioperative beta-blockers: not indicated  4. Invasive hemodynamic monitoring perioperatively: not indicated  5. Deep vein thrombosis prophylaxis postoperatively:regimen to be chosen by surgical team  6. Surveillance for postoperative MI with ECG immediately postoperatively and on postoperative days 1 and 2 AND troponin levels 24 hours postoperatively and on day 4 or hospital discharge (whichever comes first): not indicated  7. Other measures: Consultation with hospitalist for in-hospital postoperative management of Htn     Medicare Annual Wellness Visit  Name: Starr Naqvi Date: 2021   MRN: <[id number]> Sex: Male   Age: 72 y.o. Ethnicity: Non- / Non    : 1955 Race: White (non-)      Rogelio Pastrana is here for Pre-op Exam ([facility] sholder [facility]/10/[facility])    Screenings for behavioral, psychosocial and functional/safety risks, and cognitive dysfunction are all negative except as indicated below. These results, as well as other patient data from the [address] form, are documented in Flowsheets linked to this Encounter. Allergies   Allergen Reactions    Vicodin [Hydrocodone-Acetaminophen] Itching     Creepy crawly sensation       Prior to Visit Medications    Medication Sig Taking?  Authorizing Provider   amLODIPine (NORVASC) 5 MG tablet TAKE ONE TABLET BY MOUTH DAILY Yes Sandra Ferguson MD   rosuvastatin (CRESTOR) 20 MG tablet Take 1 tablet by mouth daily Yes Sandra Ferguson MD   hydroCHLOROthiazide (MICROZIDE) 12.5 MG capsule TAKE ONE Fernanda Peaneema Yes Marvin Sparrow MD   quinapril (ACCUPRIL) 40 MG tablet TAKE ONE TABLET BY MOUTH DAILY Yes Marvin Sparrow MD   venlafaxine (EFFEXOR) 75 MG tablet TAKE ONE TABLET BY MOUTH THREE TIMES A DAY Yes Marvin Sparrow MD       Past Medical History:   Diagnosis Date    Abnormal liver enzymes     Anxiety     followed by psych    Bilateral carotid artery stenosis 2021    Chronic back pain     Depression     followed by psych    Gilbert's syndrome     Hyperlipidemia 10/12/2010    Hypertension 10/12/2010    Unspecified sleep apnea     using mask.  Vitamin D deficiency disease        Past Surgical History:   Procedure Laterality Date    APPENDECTOMY      COLONOSCOPY      Q10    ELBOW SURGERY      reconstruction    HERNIA REPAIR      Hiatal    KNEE ARTHROSCOPY         Family History   Problem Relation Age of Onset    Diabetes Mother          age 76    Coronary Art Dis Mother     Coronary Art Dis Father 48         age 59    Heart Disease Father     Alcohol Abuse Father     High Blood Pressure Brother     Stroke Brother         alcohol       CareTeam (Including outside providers/suppliers regularly involved in providing care):   Patient Care Team:  Marvin Sparrow MD as PCP - General  Marvin Sparrow MD as PCP - [facility] Empaneled Provider    Wt Readings from Last 3 Encounters:   21 289 lb (131.1 kg)   21 290 lb 6.4 oz (131.7 kg)   21 292 lb (132.5 kg)     Vitals:    21 1025   BP: 128/84   Site: Right Upper Arm   Position: Sitting   Cuff Size: Large Adult   Pulse: 78   Temp: 96.8 °F (36 °C)   SpO2: 96%   Weight: 289 lb (131.1 kg)     Body mass index is 38.13 kg/m². Based upon direct observation of the patient, evaluation of cognition reveals recent and remote memory intact.     General Appearance: alert and oriented to person, place and time, well developed and well- nourished, in no acute distress  Skin: warm and dry, no rash or erythema  Head: normocephalic and atraumatic  Eyes: pupils equal, round, and reactive to light, extraocular eye movements intact, conjunctivae normal  ENT: tympanic membrane, external ear and ear canal normal bilaterally, nose without deformity, nasal mucosa and turbinates normal without polyps  Neck: supple and non-tender without mass, no thyromegaly or thyroid nodules, no cervical lymphadenopathy  Pulmonary/Chest: clear to auscultation bilaterally- no wheezes, rales or rhonchi, normal air movement, no respiratory distress  Cardiovascular: normal rate, regular rhythm, normal S1 and S2, no murmurs, rubs, clicks, or gallops, distal pulses intact, no carotid bruits  Abdomen: soft, non-tender, non-distended, normal bowel sounds, no masses or organomegaly  Extremities: no cyanosis, clubbing or edema  Musculoskeletal: normal range of motion, no joint swelling, deformity or tenderness  Neurologic: reflexes normal and symmetric, no cranial nerve deficit, gait, coordination and speech normal    Patient's complete Health Risk Assessment and screening values have been reviewed and are found in Flowsheets. The following problems were reviewed today and where indicated follow up appointments were made and/or referrals ordered.     Positive Risk Factor Screenings with Interventions:         Substance History:  Social History     Tobacco History     Smoking Status  Former Smoker Quit date  1/1/1996 Smoking Frequency  0.7 packs/day Smoking Tobacco Type  Cigarettes    Smokeless Tobacco Use  Never Used    Tobacco Comment  started smoking 1 1/2 ago, smokes 6-7 cig daily          Alcohol History     Alcohol Use Status  Yes Drinks/Week  42 Cans of beer per week Amount  42.0 standard drinks of alcohol/wk          Drug Use     Drug Use Status  Yes Types  Marijuana Comment  last used yesterday          Sexual Activity     Sexually Active  Yes Partners  Female               Alcohol Screening:       A score of 8 or more is associated with harmful or hazardous drinking. A score of 13 or more in women, and 15 or more in men, is likely to indicate alcohol dependence. Substance Abuse Interventions:  · Alcohol misuse/dependence:  patient agrees to limit alcohol intake to a moderate level- no more than 14 drinks/week and 4 drinks per occasion for men, or no more than 7 drinks/week and 3 drinks/occasion for women     Health Habits/Nutrition:        Health Habits/Nutrition Interventions:  · Inadequate physical activity:  patient is not ready to increase his/her physical activity level at this time       Personalized Preventive Plan   Current Health Maintenance Status  Immunization History   Administered Date(s) Administered    COVID-19, Bryan Peter, PF, 30mcg/0.3mL 03/02/2021, 03/23/2021    Influenza Virus Vaccine 12/01/2019    Pneumococcal Polysaccharide ([id number]) 04/30/2021    Tdap (Boostrix, Adacel) 04/03/2013    Zoster Live (Zostavax) 06/30/2016        Health Maintenance   Topic Date Due    Shingles Vaccine (2 of 3) 08/25/2016    Colon cancer screen colonoscopy  11/22/2016    Annual Wellness Visit (AWV)  Never done    Flu vaccine (1) 09/01/2021    Potassium monitoring  05/06/2022    Creatinine monitoring  05/06/2022    Lipid screen  07/19/2022    DTaP/Tdap/Td vaccine (2 - Td or Tdap) 04/03/2023    Pneumococcal 65+ years Vaccine  Completed    COVID-19 Vaccine  Completed    AAA screen  Completed    Hepatitis C screen  Completed    HIV screen  Addressed    Hepatitis A vaccine  Aged Out    Hepatitis B vaccine  Aged Out    Hib vaccine  Aged Out    Meningococcal (ACWY) vaccine  Aged Out     Recommendations for [place] Due: see orders and patient instructions/AVS.  .   Recommended screening schedule for the next 5-10 years is provided to the patient in written form: see Patient Instructions/AVS.    Lashon Baxter was seen today for pre-op exam.    Diagnoses and all orders for this visit:    Primary osteoarthritis of left shoulder    Pre-op exam    RAYNA on CPAP    Essential hypertension    Mixed hyperlipidemia

## 2021-09-30 PROBLEM — Z01.818 PRE-OP EXAM: Status: RESOLVED | Noted: 2021-08-31 | Resolved: 2021-09-30

## 2021-12-17 RX ORDER — HYDROCHLOROTHIAZIDE 12.5 MG/1
CAPSULE, GELATIN COATED ORAL
Qty: 60 CAPSULE | Refills: 0 | Status: SHIPPED | OUTPATIENT
Start: 2021-12-17 | End: 2022-01-20 | Stop reason: SDUPTHER

## 2022-01-19 DIAGNOSIS — E78.2 MIXED HYPERLIPIDEMIA: ICD-10-CM

## 2022-01-19 RX ORDER — VENLAFAXINE 75 MG/1
TABLET ORAL
Qty: 90 TABLET | Refills: 3 | Status: SHIPPED | OUTPATIENT
Start: 2022-01-19

## 2022-01-19 RX ORDER — ROSUVASTATIN CALCIUM 20 MG/1
TABLET, COATED ORAL
Qty: 30 TABLET | Refills: 5 | Status: SHIPPED | OUTPATIENT
Start: 2022-01-19 | End: 2022-07-10

## 2022-01-20 RX ORDER — HYDROCHLOROTHIAZIDE 12.5 MG/1
CAPSULE, GELATIN COATED ORAL
Qty: 180 CAPSULE | Refills: 2 | Status: SHIPPED | OUTPATIENT
Start: 2022-01-20 | End: 2022-10-24

## 2022-03-30 RX ORDER — QUINAPRIL 40 MG/1
TABLET ORAL
Qty: 90 TABLET | Refills: 3 | Status: SHIPPED | OUTPATIENT
Start: 2022-03-30

## 2022-07-10 DIAGNOSIS — E78.2 MIXED HYPERLIPIDEMIA: ICD-10-CM

## 2022-07-10 RX ORDER — ROSUVASTATIN CALCIUM 20 MG/1
TABLET, COATED ORAL
Qty: 60 TABLET | Refills: 5 | Status: SHIPPED | OUTPATIENT
Start: 2022-07-10

## 2022-08-22 RX ORDER — AMLODIPINE BESYLATE 5 MG/1
TABLET ORAL
Qty: 90 TABLET | Refills: 3 | Status: SHIPPED | OUTPATIENT
Start: 2022-08-22

## 2022-10-24 RX ORDER — HYDROCHLOROTHIAZIDE 12.5 MG/1
CAPSULE, GELATIN COATED ORAL
Qty: 60 CAPSULE | Refills: 0 | Status: SHIPPED | OUTPATIENT
Start: 2022-10-24

## 2022-12-28 RX ORDER — BENAZEPRIL HYDROCHLORIDE 40 MG/1
40 TABLET, FILM COATED ORAL DAILY
Qty: 90 TABLET | Refills: 3 | Status: SHIPPED | OUTPATIENT
Start: 2022-12-28 | End: 2023-01-01 | Stop reason: ALTCHOICE

## 2023-01-01 RX ORDER — LOSARTAN POTASSIUM 100 MG/1
100 TABLET ORAL DAILY
Qty: 30 TABLET | Refills: 5 | Status: SHIPPED | OUTPATIENT
Start: 2023-01-01

## 2023-02-08 ENCOUNTER — TELEPHONE (OUTPATIENT)
Dept: FAMILY MEDICINE CLINIC | Age: 68
End: 2023-02-08

## 2023-02-08 RX ORDER — VENLAFAXINE 75 MG/1
TABLET ORAL
Qty: 90 TABLET | Refills: 5 | Status: SHIPPED | OUTPATIENT
Start: 2023-02-08

## 2023-03-14 SDOH — ECONOMIC STABILITY: HOUSING INSECURITY
IN THE LAST 12 MONTHS, WAS THERE A TIME WHEN YOU DID NOT HAVE A STEADY PLACE TO SLEEP OR SLEPT IN A SHELTER (INCLUDING NOW)?: NO

## 2023-03-14 SDOH — ECONOMIC STABILITY: FOOD INSECURITY: WITHIN THE PAST 12 MONTHS, THE FOOD YOU BOUGHT JUST DIDN'T LAST AND YOU DIDN'T HAVE MONEY TO GET MORE.: NEVER TRUE

## 2023-03-14 SDOH — ECONOMIC STABILITY: TRANSPORTATION INSECURITY
IN THE PAST 12 MONTHS, HAS LACK OF TRANSPORTATION KEPT YOU FROM MEETINGS, WORK, OR FROM GETTING THINGS NEEDED FOR DAILY LIVING?: NO

## 2023-03-14 SDOH — ECONOMIC STABILITY: INCOME INSECURITY: HOW HARD IS IT FOR YOU TO PAY FOR THE VERY BASICS LIKE FOOD, HOUSING, MEDICAL CARE, AND HEATING?: NOT VERY HARD

## 2023-03-14 SDOH — ECONOMIC STABILITY: FOOD INSECURITY: WITHIN THE PAST 12 MONTHS, YOU WORRIED THAT YOUR FOOD WOULD RUN OUT BEFORE YOU GOT MONEY TO BUY MORE.: NEVER TRUE

## 2023-04-24 ENCOUNTER — TELEPHONE (OUTPATIENT)
Dept: FAMILY MEDICINE CLINIC | Age: 68
End: 2023-04-24

## 2023-04-24 ENCOUNTER — OFFICE VISIT (OUTPATIENT)
Dept: FAMILY MEDICINE CLINIC | Age: 68
End: 2023-04-24

## 2023-04-24 VITALS
SYSTOLIC BLOOD PRESSURE: 130 MMHG | BODY MASS INDEX: 37.29 KG/M2 | DIASTOLIC BLOOD PRESSURE: 86 MMHG | WEIGHT: 281.4 LBS | HEIGHT: 73 IN | HEART RATE: 98 BPM | OXYGEN SATURATION: 96 %

## 2023-04-24 DIAGNOSIS — I10 ESSENTIAL HYPERTENSION: ICD-10-CM

## 2023-04-24 DIAGNOSIS — F32.0 CURRENT MILD EPISODE OF MAJOR DEPRESSIVE DISORDER WITHOUT PRIOR EPISODE (HCC): ICD-10-CM

## 2023-04-24 DIAGNOSIS — E78.2 MIXED HYPERLIPIDEMIA: Primary | ICD-10-CM

## 2023-04-24 DIAGNOSIS — M19.079 ARTHRITIS OF FOOT: ICD-10-CM

## 2023-04-24 DIAGNOSIS — F10.10 ALCOHOL ABUSE: ICD-10-CM

## 2023-04-24 DIAGNOSIS — B35.1 TOENAIL FUNGUS: ICD-10-CM

## 2023-04-24 DIAGNOSIS — Z12.11 COLON CANCER SCREENING: Primary | ICD-10-CM

## 2023-04-24 DIAGNOSIS — E78.2 MIXED HYPERLIPIDEMIA: ICD-10-CM

## 2023-04-24 DIAGNOSIS — G47.33 OSA ON CPAP: ICD-10-CM

## 2023-04-24 DIAGNOSIS — Z99.89 OSA ON CPAP: ICD-10-CM

## 2023-04-24 DIAGNOSIS — Z00.00 MEDICARE ANNUAL WELLNESS VISIT, SUBSEQUENT: ICD-10-CM

## 2023-04-24 RX ORDER — SULINDAC 150 MG/1
150 TABLET ORAL 2 TIMES DAILY
Qty: 60 TABLET | Refills: 3 | Status: SHIPPED | OUTPATIENT
Start: 2023-04-24

## 2023-04-24 RX ORDER — ZOSTER VACCINE RECOMBINANT, ADJUVANTED 50 MCG/0.5
0.5 KIT INTRAMUSCULAR SEE ADMIN INSTRUCTIONS
Qty: 0.5 ML | Refills: 0 | Status: SHIPPED | OUTPATIENT
Start: 2023-04-24 | End: 2023-10-21

## 2023-04-24 ASSESSMENT — PATIENT HEALTH QUESTIONNAIRE - PHQ9
1. LITTLE INTEREST OR PLEASURE IN DOING THINGS: 0
7. TROUBLE CONCENTRATING ON THINGS, SUCH AS READING THE NEWSPAPER OR WATCHING TELEVISION: 0
10. IF YOU CHECKED OFF ANY PROBLEMS, HOW DIFFICULT HAVE THESE PROBLEMS MADE IT FOR YOU TO DO YOUR WORK, TAKE CARE OF THINGS AT HOME, OR GET ALONG WITH OTHER PEOPLE: 0
9. THOUGHTS THAT YOU WOULD BE BETTER OFF DEAD, OR OF HURTING YOURSELF: 0
5. POOR APPETITE OR OVEREATING: 0
SUM OF ALL RESPONSES TO PHQ9 QUESTIONS 1 & 2: 0
SUM OF ALL RESPONSES TO PHQ QUESTIONS 1-9: 0
2. FEELING DOWN, DEPRESSED OR HOPELESS: 0
SUM OF ALL RESPONSES TO PHQ QUESTIONS 1-9: 0
3. TROUBLE FALLING OR STAYING ASLEEP: 0
4. FEELING TIRED OR HAVING LITTLE ENERGY: 0
6. FEELING BAD ABOUT YOURSELF - OR THAT YOU ARE A FAILURE OR HAVE LET YOURSELF OR YOUR FAMILY DOWN: 0
8. MOVING OR SPEAKING SO SLOWLY THAT OTHER PEOPLE COULD HAVE NOTICED. OR THE OPPOSITE, BEING SO FIGETY OR RESTLESS THAT YOU HAVE BEEN MOVING AROUND A LOT MORE THAN USUAL: 0

## 2023-04-24 ASSESSMENT — LIFESTYLE VARIABLES
HOW OFTEN DURING THE LAST YEAR HAVE YOU HAD A FEELING OF GUILT OR REMORSE AFTER DRINKING: 0
HAS A RELATIVE, FRIEND, DOCTOR, OR ANOTHER HEALTH PROFESSIONAL EXPRESSED CONCERN ABOUT YOUR DRINKING OR SUGGESTED YOU CUT DOWN: 0
HOW OFTEN DURING THE LAST YEAR HAVE YOU FOUND THAT YOU WERE NOT ABLE TO STOP DRINKING ONCE YOU HAD STARTED: 0
HOW OFTEN DURING THE LAST YEAR HAVE YOU BEEN UNABLE TO REMEMBER WHAT HAPPENED THE NIGHT BEFORE BECAUSE YOU HAD BEEN DRINKING: 0
HOW OFTEN DURING THE LAST YEAR HAVE YOU FAILED TO DO WHAT WAS NORMALLY EXPECTED FROM YOU BECAUSE OF DRINKING: 0
HAVE YOU OR SOMEONE ELSE BEEN INJURED AS A RESULT OF YOUR DRINKING: 0
HOW OFTEN DO YOU HAVE A DRINK CONTAINING ALCOHOL: 4 OR MORE TIMES A WEEK
HOW OFTEN DURING THE LAST YEAR HAVE YOU NEEDED AN ALCOHOLIC DRINK FIRST THING IN THE MORNING TO GET YOURSELF GOING AFTER A NIGHT OF HEAVY DRINKING: 0
HOW MANY STANDARD DRINKS CONTAINING ALCOHOL DO YOU HAVE ON A TYPICAL DAY: 3 OR 4

## 2023-04-24 NOTE — PROGRESS NOTES
Medicare Annual Wellness Visit    Lieutenant Whitaker is here for Medicare AWV    Assessment & Plan   Colon cancer screening  -     Fecal DNA Colorectal cancer screening ([facility]olog[facility])  Arthritis of foot  Assessment & Plan:   Result of trauma-trial of clinoril  Mixed hyperlipidemia  Assessment & Plan:   Unclear control, changes made today: labs  Current mild episode of major depressive disorder without prior episode ([address]  Essential hypertension  Assessment & Plan:   Well-controlled, continue current medications  Alcohol abuse  Assessment & Plan:   better but still drinking  RAYNA on CPAP  Assessment & Plan:   Stable--same plan  Toenail fungus  Assessment & Plan:   penlac sol'n    Recommendations for Preventive Services Due: see orders and patient instructions/AVS.  Recommended screening schedule for the next 5-10 years is provided to the patient in written form: see Patient Instructions/AVS.     No follow-ups on file. Subjective   The following acute and/or chronic problems were also addressed today:  Arthritis of foot   Result of trauma-trial of clinoril    Hyperlipidemia   Unclear control, changes made today: labs    Essential hypertension   Well-controlled, continue current medications    Alcohol abuse   better but still drinking    RAYNA on CPAP   Stable--same plan    Toenail fungus   penlac sol'n      Patient's complete Health Risk Assessment and screening values have been reviewed and are found in Flowsheets. The following problems were reviewed today and where indicated follow up appointments were made and/or referrals ordered. Positive Risk Factor Screenings with Interventions:    Fall Risk:  Do you feel unsteady or are you worried about falling? : (!) yes  2 or more falls in past year?: no  Fall with injury in past year?: no     Interventions:     Tx for OA      Alcohol Screening:  Alcohol Use: Heavy Drinker    Frequency of Alcohol Consumption: 4 or more times a week    Average Number of Drinks: 3 or 4

## 2023-05-01 RX ORDER — HYDROCHLOROTHIAZIDE 12.5 MG/1
12.5 CAPSULE, GELATIN COATED ORAL 2 TIMES DAILY
Qty: 60 CAPSULE | Refills: 0 | Status: SHIPPED | OUTPATIENT
Start: 2023-05-01 | End: 2023-05-31

## 2023-05-31 RX ORDER — HYDROCHLOROTHIAZIDE 12.5 MG/1
CAPSULE, GELATIN COATED ORAL
Qty: 60 CAPSULE | Refills: 5 | Status: SHIPPED | OUTPATIENT
Start: 2023-05-31

## 2023-07-04 DIAGNOSIS — E78.2 MIXED HYPERLIPIDEMIA: ICD-10-CM

## 2023-07-04 RX ORDER — ROSUVASTATIN CALCIUM 20 MG/1
TABLET, COATED ORAL
Qty: 30 TABLET | Refills: 3 | Status: SHIPPED | OUTPATIENT
Start: 2023-07-04

## 2023-07-20 RX ORDER — LOSARTAN POTASSIUM 100 MG/1
TABLET ORAL
Qty: 30 TABLET | Refills: 5 | Status: SHIPPED | OUTPATIENT
Start: 2023-07-20

## 2023-08-22 RX ORDER — AMLODIPINE BESYLATE 5 MG/1
5 TABLET ORAL DAILY
Qty: 90 TABLET | Refills: 3 | Status: SHIPPED | OUTPATIENT
Start: 2023-08-22

## 2023-11-13 DIAGNOSIS — E78.2 MIXED HYPERLIPIDEMIA: ICD-10-CM

## 2023-11-13 RX ORDER — ROSUVASTATIN CALCIUM 20 MG/1
20 TABLET, COATED ORAL DAILY
Qty: 30 TABLET | Refills: 3 | Status: SHIPPED | OUTPATIENT
Start: 2023-11-13

## 2023-12-21 DIAGNOSIS — E78.2 MIXED HYPERLIPIDEMIA: ICD-10-CM

## 2023-12-21 DIAGNOSIS — I10 ESSENTIAL HYPERTENSION: ICD-10-CM

## 2023-12-21 LAB
ALBUMIN SERPL-MCNC: 4.6 G/DL (ref 3.4–5)
ALBUMIN/GLOB SERPL: 2.1 {RATIO} (ref 1.1–2.2)
ALP SERPL-CCNC: 48 U/L (ref 40–129)
ALT SERPL-CCNC: 33 U/L (ref 10–40)
ANION GAP SERPL CALCULATED.3IONS-SCNC: 13 MMOL/L (ref 3–16)
AST SERPL-CCNC: 33 U/L (ref 15–37)
BASOPHILS # BLD: 0.1 K/UL (ref 0–0.2)
BASOPHILS NFR BLD: 0.9 %
BILIRUB SERPL-MCNC: 0.6 MG/DL (ref 0–1)
BUN SERPL-MCNC: 18 MG/DL (ref 7–20)
CALCIUM SERPL-MCNC: 9.2 MG/DL (ref 8.3–10.6)
CHLORIDE SERPL-SCNC: 98 MMOL/L (ref 99–110)
CHOLEST SERPL-MCNC: 172 MG/DL (ref 0–199)
CO2 SERPL-SCNC: 26 MMOL/L (ref 21–32)
CREAT SERPL-MCNC: 1 MG/DL (ref 0.8–1.3)
EOSINOPHIL # BLD: 0.3 K/UL (ref 0–0.6)
EOSINOPHIL NFR BLD: 4.2 %
GFR SERPLBLD CREATININE-BSD FMLA CKD-EPI: >60 ML/MIN/{1.73_M2}
GLUCOSE SERPL-MCNC: 100 MG/DL (ref 70–99)
HCT VFR BLD AUTO: 46 % (ref 40.5–52.5)
HDLC SERPL-MCNC: 72 MG/DL (ref 40–60)
HGB BLD-MCNC: 15.8 G/DL (ref 13.5–17.5)
LDLC SERPL CALC-MCNC: 89 MG/DL
LYMPHOCYTES # BLD: 1.1 K/UL (ref 1–5.1)
LYMPHOCYTES NFR BLD: 18.5 %
MCH RBC QN AUTO: 32.2 PG (ref 26–34)
MCHC RBC AUTO-ENTMCNC: 34.3 G/DL (ref 31–36)
MCV RBC AUTO: 93.8 FL (ref 80–100)
MONOCYTES # BLD: 0.7 K/UL (ref 0–1.3)
MONOCYTES NFR BLD: 11.6 %
NEUTROPHILS # BLD: 4 K/UL (ref 1.7–7.7)
NEUTROPHILS NFR BLD: 64.8 %
PLATELET # BLD AUTO: 143 K/UL (ref 135–450)
PMV BLD AUTO: 9.7 FL (ref 5–10.5)
POTASSIUM SERPL-SCNC: 4.4 MMOL/L (ref 3.5–5.1)
RBC # BLD AUTO: 4.9 M/UL (ref 4.2–5.9)
SODIUM SERPL-SCNC: 137 MMOL/L (ref 136–145)
TRIGL SERPL-MCNC: 55 MG/DL (ref 0–150)
VLDLC SERPL CALC-MCNC: 11 MG/DL
WBC # BLD AUTO: 6.1 K/UL (ref 4–11)

## 2023-12-22 DIAGNOSIS — E83.19 INCREASED STORAGE IRON: ICD-10-CM

## 2023-12-22 LAB
FERRITIN SERPL IA-MCNC: 310.1 NG/ML (ref 30–400)
IRON SATN MFR SERPL: 35 % (ref 20–50)
IRON SERPL-MCNC: 97 UG/DL (ref 59–158)
TIBC SERPL-MCNC: 275 UG/DL (ref 260–445)

## 2024-01-29 RX ORDER — LOSARTAN POTASSIUM 100 MG/1
100 TABLET ORAL DAILY
Qty: 30 TABLET | Refills: 5 | Status: SHIPPED | OUTPATIENT
Start: 2024-01-29

## 2024-03-20 RX ORDER — VENLAFAXINE 75 MG/1
TABLET ORAL
Qty: 90 TABLET | Refills: 5 | Status: SHIPPED | OUTPATIENT
Start: 2024-03-20

## 2024-03-20 NOTE — TELEPHONE ENCOUNTER
Requested Prescriptions     Pending Prescriptions Disp Refills    venlafaxine (EFFEXOR) 75 MG tablet [Pharmacy Med Name: VENLAFAXINE HCL 75 MG TABLET] 90 tablet 5     Sig: TAKE ONE TABLET BY MOUTH THREE TIMES A DAY     LOV 4.24.23

## 2024-03-26 ENCOUNTER — TELEPHONE (OUTPATIENT)
Dept: FAMILY MEDICINE CLINIC | Age: 69
End: 2024-03-26

## 2024-03-27 DIAGNOSIS — E78.2 MIXED HYPERLIPIDEMIA: ICD-10-CM

## 2024-03-27 RX ORDER — ROSUVASTATIN CALCIUM 20 MG/1
20 TABLET, COATED ORAL DAILY
Qty: 30 TABLET | Refills: 3 | Status: SHIPPED | OUTPATIENT
Start: 2024-03-27

## 2024-03-27 RX ORDER — HYDROCHLOROTHIAZIDE 12.5 MG/1
12.5 CAPSULE, GELATIN COATED ORAL 2 TIMES DAILY
Qty: 60 CAPSULE | Refills: 5 | Status: SHIPPED | OUTPATIENT
Start: 2024-03-27

## 2024-07-13 ASSESSMENT — SLEEP AND FATIGUE QUESTIONNAIRES
HOW LIKELY ARE YOU TO NOD OFF OR FALL ASLEEP WHILE SITTING AND READING: SLIGHT CHANCE OF DOZING
HOW LIKELY ARE YOU TO NOD OFF OR FALL ASLEEP WHEN YOU ARE A PASSENGER IN A CAR FOR AN HOUR WITHOUT A BREAK: SLIGHT CHANCE OF DOZING
HOW LIKELY ARE YOU TO NOD OFF OR FALL ASLEEP WHILE SITTING INACTIVE IN A PUBLIC PLACE: WOULD NEVER DOZE
HOW LIKELY ARE YOU TO NOD OFF OR FALL ASLEEP WHILE WATCHING TV: SLIGHT CHANCE OF DOZING
HOW LIKELY ARE YOU TO NOD OFF OR FALL ASLEEP WHILE SITTING AND READING: SLIGHT CHANCE OF DOZING
HOW LIKELY ARE YOU TO NOD OFF OR FALL ASLEEP WHILE LYING DOWN TO REST IN THE AFTERNOON WHEN CIRCUMSTANCES PERMIT: MODERATE CHANCE OF DOZING
HOW LIKELY ARE YOU TO NOD OFF OR FALL ASLEEP WHILE WATCHING TV: SLIGHT CHANCE OF DOZING
HOW LIKELY ARE YOU TO NOD OFF OR FALL ASLEEP WHILE SITTING QUIETLY AFTER LUNCH WITHOUT ALCOHOL: SLIGHT CHANCE OF DOZING
HOW LIKELY ARE YOU TO NOD OFF OR FALL ASLEEP WHEN YOU ARE A PASSENGER IN A CAR FOR AN HOUR WITHOUT A BREAK: SLIGHT CHANCE OF DOZING
HOW LIKELY ARE YOU TO NOD OFF OR FALL ASLEEP WHILE SITTING AND TALKING TO SOMEONE: WOULD NEVER DOZE
HOW LIKELY ARE YOU TO NOD OFF OR FALL ASLEEP IN A CAR, WHILE STOPPED FOR A FEW MINUTES IN TRAFFIC: WOULD NEVER DOZE
HOW LIKELY ARE YOU TO NOD OFF OR FALL ASLEEP WHILE LYING DOWN TO REST IN THE AFTERNOON WHEN CIRCUMSTANCES PERMIT: MODERATE CHANCE OF DOZING
HOW LIKELY ARE YOU TO NOD OFF OR FALL ASLEEP IN A CAR, WHILE STOPPED FOR A FEW MINUTES IN TRAFFIC: WOULD NEVER DOZE
HOW LIKELY ARE YOU TO NOD OFF OR FALL ASLEEP WHILE SITTING AND TALKING TO SOMEONE: WOULD NEVER DOZE
HOW LIKELY ARE YOU TO NOD OFF OR FALL ASLEEP WHILE SITTING QUIETLY AFTER LUNCH WITHOUT ALCOHOL: SLIGHT CHANCE OF DOZING
HOW LIKELY ARE YOU TO NOD OFF OR FALL ASLEEP WHILE SITTING INACTIVE IN A PUBLIC PLACE: WOULD NEVER DOZE
ESS TOTAL SCORE: 6

## 2024-07-16 ENCOUNTER — OFFICE VISIT (OUTPATIENT)
Age: 69
End: 2024-07-16
Payer: MEDICARE

## 2024-07-16 VITALS
OXYGEN SATURATION: 98 % | DIASTOLIC BLOOD PRESSURE: 84 MMHG | HEART RATE: 90 BPM | HEIGHT: 73 IN | WEIGHT: 299.83 LBS | BODY MASS INDEX: 39.74 KG/M2 | SYSTOLIC BLOOD PRESSURE: 128 MMHG

## 2024-07-16 DIAGNOSIS — E66.01 SEVERE OBESITY (BMI 35.0-39.9) WITH COMORBIDITY (HCC): ICD-10-CM

## 2024-07-16 DIAGNOSIS — G47.33 OSA ON CPAP: Primary | ICD-10-CM

## 2024-07-16 PROCEDURE — G8417 CALC BMI ABV UP PARAM F/U: HCPCS | Performed by: STUDENT IN AN ORGANIZED HEALTH CARE EDUCATION/TRAINING PROGRAM

## 2024-07-16 PROCEDURE — 3017F COLORECTAL CA SCREEN DOC REV: CPT | Performed by: STUDENT IN AN ORGANIZED HEALTH CARE EDUCATION/TRAINING PROGRAM

## 2024-07-16 PROCEDURE — 3079F DIAST BP 80-89 MM HG: CPT | Performed by: STUDENT IN AN ORGANIZED HEALTH CARE EDUCATION/TRAINING PROGRAM

## 2024-07-16 PROCEDURE — 99204 OFFICE O/P NEW MOD 45 MIN: CPT | Performed by: STUDENT IN AN ORGANIZED HEALTH CARE EDUCATION/TRAINING PROGRAM

## 2024-07-16 PROCEDURE — 1036F TOBACCO NON-USER: CPT | Performed by: STUDENT IN AN ORGANIZED HEALTH CARE EDUCATION/TRAINING PROGRAM

## 2024-07-16 PROCEDURE — G8427 DOCREV CUR MEDS BY ELIG CLIN: HCPCS | Performed by: STUDENT IN AN ORGANIZED HEALTH CARE EDUCATION/TRAINING PROGRAM

## 2024-07-16 PROCEDURE — 3074F SYST BP LT 130 MM HG: CPT | Performed by: STUDENT IN AN ORGANIZED HEALTH CARE EDUCATION/TRAINING PROGRAM

## 2024-07-16 PROCEDURE — G2211 COMPLEX E/M VISIT ADD ON: HCPCS | Performed by: STUDENT IN AN ORGANIZED HEALTH CARE EDUCATION/TRAINING PROGRAM

## 2024-07-16 PROCEDURE — 1123F ACP DISCUSS/DSCN MKR DOCD: CPT | Performed by: STUDENT IN AN ORGANIZED HEALTH CARE EDUCATION/TRAINING PROGRAM

## 2024-07-16 NOTE — PATIENT INSTRUCTIONS
you plan a long drive the next day.  Get to bed early and do not stay up late packing.    4. Avoid alcohol both the night before your trip and the during your trip.  Alcohol will disrupt sleep and make you more tired the next day.  Sleepiness and alcohol are additive in increasing impairment of your driving ability.    5. Avoid any sedative medications, including sedative antihistamines that are often contained in cold or allergy medications, the night before you drive as they may have long lasting effects the next day.    6. Travel during non-sleeping hours.  Accidents due to sleepiness are more common during the nighttime hours.    7. If sleepy, stop and rest.  Drink coffee, walk around or have a brief nap in your car if you are sleepy.  Have a 10-15 minute break after every 2 hours of driving.    8. Drive with a .  Share the driving.  Relax in the back seat until it is your time to share the driving again.       Resmed magnetic mask recall updated contraindications and warning:    Updated Contraindications1  Masks with magnetic components are contraindicated for use by patients where they, or anyone in close physical contact while using the mask, have the following:  Active medical implants that interact with magnets (i.e., pacemakers, implantable cardioverter defibrillators (ICD), neurostimulators, cerebrospinal fluid (CSF) shunts, insulin/infusion pumps)  Metallic implants/objects containing ferromagnetic material (i.e., aneurysm clips/flow disruption devices, embolic coils, stents, valves, electrodes, implants to restore hearing or balance with implanted magnets, ocular implants, metallic splinters in the eye).    Updated Warning2  Keep the mask magnets at a safe distance of at least 6 inches (150 mm) away from implants or medical devices that may be adversely affected by magnetic interference. This warning applies to you or anyone in close physical contact with your mask. The magnets are in the frame

## 2024-07-16 NOTE — PROGRESS NOTES
[facility]  Sleep Medicine  [address], [address]  [place] [address]    Chief Complaint   Patient presents with    Sleep Apnea       Kyle Singh is a 68 y.o. male who comes in for evaluation of previously diagnosed RAYNA. He was diagnosed several years ago. Patient has been on PAP therapy since then. He is long overdue for supplies, his mask and other parts are damaged. His PAP machine is also becoming dysfunctional and displaying error message about motor having exceeded its lifespan.    Pertinent PMHx includes: depression, RAYNA, HLD, OA, severe obesity.    He was diagnosed with obstructive sleep apnea and is being treated with PAP therapy.   He has been on PAP therapy for over 20 years.  He states he wears the PAP device every night.   He goes to bed at 8am. He falls asleep in about 60 minutes.  He awakens 4 times per night and takes no nap . The average total amount of sleep is about 8 hours per night. He does not use sleep aid/s. Symptoms of sleep apnea have improved since using PAP therapy.  He continues to have complaints of disrupted sleep.  He is not snoring with the machine on.   He does complain of mask fit / discomfort issues and high/frequent leaks due to supplies being worn out.   DME: None at this time  Mask: full face mask medium  Machine: ResMed Airsense 10      DATA REVIEWED TODAY:    Diagnostic Review  Records of previous sleep test report unavailable for my review at this time.    Franklin & Insomnia Severity Index scores      7/13/2024     9:23 AM   Sleep Medicine   Sitting and reading 1   Watching TV 1   Sitting, inactive in a public place (e.g. a theatre or a meeting) 0   As a passenger in a car for an hour without a break 1   Lying down to rest in the afternoon when circumstances permit 2   Sitting and talking to someone 0   Sitting quietly after a lunch without alcohol 1   In a car, while stopped for a few minutes in traffic 0   Franklin Sleepiness Score 6

## 2024-07-23 DIAGNOSIS — E78.2 MIXED HYPERLIPIDEMIA: ICD-10-CM

## 2024-07-23 RX ORDER — ROSUVASTATIN CALCIUM 20 MG/1
20 TABLET, COATED ORAL DAILY
Qty: 30 TABLET | Refills: 3 | Status: SHIPPED | OUTPATIENT
Start: 2024-07-23

## 2024-07-23 RX ORDER — LOSARTAN POTASSIUM 100 MG/1
100 TABLET ORAL DAILY
Qty: 30 TABLET | Refills: 5 | Status: SHIPPED | OUTPATIENT
Start: 2024-07-23

## 2024-07-29 ENCOUNTER — HOSPITAL ENCOUNTER (OUTPATIENT)
Dept: SLEEP CENTER | Age: 69
Discharge: HOME | End: 2024-07-29
Payer: MEDICARE

## 2024-07-29 DIAGNOSIS — G47.33 OSA ON CPAP: ICD-10-CM

## 2024-07-29 PROCEDURE — 95806 SLEEP STUDY UNATT&RESP EFFT: CPT

## 2024-08-12 RX ORDER — AMLODIPINE BESYLATE 5 MG/1
5 TABLET ORAL DAILY
Qty: 90 TABLET | Refills: 3 | Status: SHIPPED | OUTPATIENT
Start: 2024-08-12

## 2024-08-12 NOTE — TELEPHONE ENCOUNTER
Medication:   Requested Prescriptions     Pending Prescriptions Disp Refills    amLODIPine (NORVASC) 5 MG tablet [Pharmacy Med Name: AMLODIPINE BESYLATE 5 MG TAB] 90 tablet 3     Sig: TAKE 1 TABLET BY MOUTH DAILY     Last Filled:  08/22/2023    Last appt: 4/24/2023   Next appt: Visit date not found    Last OARRS:       2/19/2016    11:52 AM   RX Monitoring   Attestation The Prescription Monitoring Report for this patient was reviewed today.   Periodic Controlled Substance Monitoring Possible medication side effects, risk of tolerance and/or dependence, and alternative treatments discussed;No signs of potential drug abuse or diversion identified.

## 2024-08-14 ENCOUNTER — TELEPHONE (OUTPATIENT)
Dept: PULMONOLOGY | Age: 69
End: 2024-08-14

## 2024-08-14 NOTE — TELEPHONE ENCOUNTER
Please inform patient that his sleep study showed severe sleep apnea and that he stopped breathing or his breathing was inadequate about 35 times for every hour of sleep. His blood oxygen also dropped as low as 86% during the night.     Patient already has a CPAP but needs new machine and new supplies.  He had already agreed with going with [facility]. If they have any questions, they can let you know or message me via [employer]. If patient agrees with plan, please route the PAP order to [facility] (order already completed on a separate order encounter). Patient should be scheduled for 31 to 90 days follow up.    Thanks  Roberto Carlos Donnelly MD

## 2024-08-14 NOTE — PROGRESS NOTES
Diagnosis: [x] RAYNA  (G47.33) [] CSA (G47.31) []  Other:__________________   Length of Need: [] 13 months [x]  99 Months                                          []  Other:__________________   Machine (CHRIS): [] Respironics Auto (with modem for remote monitoring)       [] Other:____________________    [x]  ResMed Auto (with modem for remote monitoring)    [x]  CPAP () [] Bilevel ()   Mode: Mode:   [x] Auto [] Fixed [] Auto [] Spontaneous   Pmin:_____8____cmH2O      Pmax:_____18____cmH2O   P:_________cmH2O    EPAPmin:__________cmH2O IPAP:__________cmH2O     IPAPmax:__________cmH2O EPAP:__________cmH2O     PSmin:_______  PSmax:_______       (ResMed) PS:_________     Flex/EPR - 3 full time                          Ramp time: 30 min Flex/EPR - 3 full time                 Ramp time: 30 min   Ramp Pressure:_____4______cmH2O Ramp Pressure:____________cmH2O         Humidifier: [x] Heated ()                                               [] Passive     [x] Water chamber replacement ()/ 1 per 6 months   Mask:   [] Nasal () /1 per 3 months [x] Full Face () /1 per 3 months   [] Patient choice -Size and fit mask [x] Patient Choice - Size and fit mask   [] Dispense: nasal cushion  [] Dispense:  [x] Dispense: full face mask  [] Dispense:    [] Headgear () / 1 per 3 months [x] Headgear () / 1 per 3 months   [] Replacement Nasal Cushion ()/2 per month [x] Interface Replacement ()/1 per month   [] Replacement Nasal Pillows ()/2 per month       Tubing: [x] Heated ()/1 per 3 months                           [] Standard ()/1 per 3 months  [] Other:____________________   Filters: [x] Non-disposable ()/1 per 6 months                 [x] Ultra-Fine, Disposable ()/2 per month     Miscellaneous: [x] Chin Strap as needed ()/ 1 per 6months      [] Other:__________________________________         Start Order Date: 08/14/24    MEDICAL JUSTIFICATION:  I, the

## 2024-08-15 NOTE — TELEPHONE ENCOUNTER
Spoke with pt to review HST results. Order to be sent to [facility].  Scheduling to call pt to scheduling for pt to schedule 30-90 day f/u.

## 2024-10-17 DIAGNOSIS — E78.2 MIXED HYPERLIPIDEMIA: ICD-10-CM

## 2024-10-17 RX ORDER — ROSUVASTATIN CALCIUM 20 MG/1
20 TABLET, COATED ORAL DAILY
Qty: 90 TABLET | Refills: 1 | Status: SHIPPED | OUTPATIENT
Start: 2024-10-17 | End: 2025-04-15

## 2024-10-23 ASSESSMENT — SLEEP AND FATIGUE QUESTIONNAIRES
HOW LIKELY ARE YOU TO NOD OFF OR FALL ASLEEP WHILE SITTING INACTIVE IN A PUBLIC PLACE: WOULD NEVER DOZE
HOW LIKELY ARE YOU TO NOD OFF OR FALL ASLEEP IN A CAR, WHILE STOPPED FOR A FEW MINUTES IN TRAFFIC: WOULD NEVER DOZE
HOW LIKELY ARE YOU TO NOD OFF OR FALL ASLEEP WHILE SITTING AND TALKING TO SOMEONE: WOULD NEVER DOZE
HOW LIKELY ARE YOU TO NOD OFF OR FALL ASLEEP WHILE SITTING AND TALKING TO SOMEONE: WOULD NEVER DOZE
HOW LIKELY ARE YOU TO NOD OFF OR FALL ASLEEP WHEN YOU ARE A PASSENGER IN A CAR FOR AN HOUR WITHOUT A BREAK: WOULD NEVER DOZE
HOW LIKELY ARE YOU TO NOD OFF OR FALL ASLEEP WHILE SITTING QUIETLY AFTER LUNCH WITHOUT ALCOHOL: SLIGHT CHANCE OF DOZING
HOW LIKELY ARE YOU TO NOD OFF OR FALL ASLEEP WHILE SITTING AND READING: WOULD NEVER DOZE
HOW LIKELY ARE YOU TO NOD OFF OR FALL ASLEEP WHILE SITTING AND READING: WOULD NEVER DOZE
HOW LIKELY ARE YOU TO NOD OFF OR FALL ASLEEP WHILE WATCHING TV: WOULD NEVER DOZE
HOW LIKELY ARE YOU TO NOD OFF OR FALL ASLEEP WHILE LYING DOWN TO REST IN THE AFTERNOON WHEN CIRCUMSTANCES PERMIT: SLIGHT CHANCE OF DOZING
HOW LIKELY ARE YOU TO NOD OFF OR FALL ASLEEP WHEN YOU ARE A PASSENGER IN A CAR FOR AN HOUR WITHOUT A BREAK: WOULD NEVER DOZE
HOW LIKELY ARE YOU TO NOD OFF OR FALL ASLEEP WHILE SITTING QUIETLY AFTER LUNCH WITHOUT ALCOHOL: SLIGHT CHANCE OF DOZING
HOW LIKELY ARE YOU TO NOD OFF OR FALL ASLEEP IN A CAR, WHILE STOPPED FOR A FEW MINUTES IN TRAFFIC: WOULD NEVER DOZE
HOW LIKELY ARE YOU TO NOD OFF OR FALL ASLEEP WHILE SITTING INACTIVE IN A PUBLIC PLACE: WOULD NEVER DOZE
ESS TOTAL SCORE: 2
HOW LIKELY ARE YOU TO NOD OFF OR FALL ASLEEP WHILE WATCHING TV: WOULD NEVER DOZE
HOW LIKELY ARE YOU TO NOD OFF OR FALL ASLEEP WHILE LYING DOWN TO REST IN THE AFTERNOON WHEN CIRCUMSTANCES PERMIT: SLIGHT CHANCE OF DOZING

## 2024-10-24 ENCOUNTER — OFFICE VISIT (OUTPATIENT)
Dept: PULMONOLOGY | Age: 69
End: 2024-10-24
Payer: MEDICARE

## 2024-10-24 VITALS
HEIGHT: 73 IN | WEIGHT: 304.8 LBS | SYSTOLIC BLOOD PRESSURE: 128 MMHG | HEART RATE: 93 BPM | DIASTOLIC BLOOD PRESSURE: 78 MMHG | BODY MASS INDEX: 40.39 KG/M2 | OXYGEN SATURATION: 95 %

## 2024-10-24 DIAGNOSIS — G47.33 OSA ON CPAP: Primary | ICD-10-CM

## 2024-10-24 DIAGNOSIS — I10 ESSENTIAL HYPERTENSION: ICD-10-CM

## 2024-10-24 DIAGNOSIS — E66.01 MORBID OBESITY WITH BMI OF 40.0-44.9, ADULT: ICD-10-CM

## 2024-10-24 PROCEDURE — 3074F SYST BP LT 130 MM HG: CPT | Performed by: STUDENT IN AN ORGANIZED HEALTH CARE EDUCATION/TRAINING PROGRAM

## 2024-10-24 PROCEDURE — G8428 CUR MEDS NOT DOCUMENT: HCPCS | Performed by: STUDENT IN AN ORGANIZED HEALTH CARE EDUCATION/TRAINING PROGRAM

## 2024-10-24 PROCEDURE — G8484 FLU IMMUNIZE NO ADMIN: HCPCS | Performed by: STUDENT IN AN ORGANIZED HEALTH CARE EDUCATION/TRAINING PROGRAM

## 2024-10-24 PROCEDURE — 3017F COLORECTAL CA SCREEN DOC REV: CPT | Performed by: STUDENT IN AN ORGANIZED HEALTH CARE EDUCATION/TRAINING PROGRAM

## 2024-10-24 PROCEDURE — G2211 COMPLEX E/M VISIT ADD ON: HCPCS | Performed by: STUDENT IN AN ORGANIZED HEALTH CARE EDUCATION/TRAINING PROGRAM

## 2024-10-24 PROCEDURE — 3078F DIAST BP <80 MM HG: CPT | Performed by: STUDENT IN AN ORGANIZED HEALTH CARE EDUCATION/TRAINING PROGRAM

## 2024-10-24 PROCEDURE — G8417 CALC BMI ABV UP PARAM F/U: HCPCS | Performed by: STUDENT IN AN ORGANIZED HEALTH CARE EDUCATION/TRAINING PROGRAM

## 2024-10-24 PROCEDURE — 1036F TOBACCO NON-USER: CPT | Performed by: STUDENT IN AN ORGANIZED HEALTH CARE EDUCATION/TRAINING PROGRAM

## 2024-10-24 PROCEDURE — 99214 OFFICE O/P EST MOD 30 MIN: CPT | Performed by: STUDENT IN AN ORGANIZED HEALTH CARE EDUCATION/TRAINING PROGRAM

## 2024-10-24 PROCEDURE — 1123F ACP DISCUSS/DSCN MKR DOCD: CPT | Performed by: STUDENT IN AN ORGANIZED HEALTH CARE EDUCATION/TRAINING PROGRAM

## 2024-10-24 NOTE — PROGRESS NOTES
[facility]  Sleep Medicine  [address] [place] [address], [address]  [place] [address]    Chief Complaint   Patient presents with    Sleep Apnea         Kyle Singh comes in today for sleep apnea follow-up . He was diagnosed with severe obstructive sleep apnea and is being treated with PAP therapy.   He has been on PAP therapy for a couple of months.  He states he wears the PAP device every night.     He falls asleep in  variable minutes.  He awakens about 4 times per night. The average total amount of sleep is about 7 hours per night and takes no naps. He does not use sleep aid/s. Symptoms of sleep apnea have improved since using PAP therapy.  He is not snoring with the machine on.  He denies any complaints of too high pressure, hunger for air, dry mouth / nose, mask fit / discomfort issues, low air humidity, high air humidity, temperature issues, and high/frequent leaks.   DME: [employer]  Mask: AirFit F20 large  Machine: ResMed Airsense 11 Autoset      DATA REVIEWED TODAY:    Diagnostic Review  HST on 2024 showed respiratory event index of 35.1/h with oxygen desaturation down to a ray of 86%.     [place]           10/23/2024     1:29 PM 2024     9:23 AM   Sleep Medicine   Sitting and reading 0 1   Watching TV 0 1   Sitting, inactive in a public place (e.g. a theatre or a meeting) 0 0   As a passenger in a car for an hour without a break 0 1   Lying down to rest in the afternoon when circumstances permit 1 2   Sitting and talking to someone 0 0   Sitting quietly after a lunch without alcohol 1 1   In a car, while stopped for a few minutes in traffic 0 0   [place] Sleepiness Score 2 6       PAP Adherence (dates: 2024 - 10/22/2024)  Total days used: 62/62  % used days >= 4 hours: 100%  Average hours used per day: 10 hrs 30 mins  Mode: auto CPAP  Pressure settin-18 cmH2O  Average pressure (95%): 12.4 cmH2O  Leaks (95%): 53.4 L/min  Residual AHI: 12.9      Medications   He

## 2024-10-24 NOTE — PROGRESS NOTES
necessary for this patient’s wellbeing.  In my opinion, the supplies are both reasonable and necessary in reference to accepted standards of medicalpractice in treatment of this patient’s condition.    Roberto Carlos Donnelly MD    NPI: [id number]       Order Signed Date: 10/24/24      Kyle Singh  1955  [address] [place] [id number]  [phone] (home)   [phone] (mobile)    Insurance:   Active Insurance as of 10/24/2024       Primary Coverage       Payor Plan Insurance Group Employer/Plan Group    MEDICARE MEDICARE PART A AND B        Payor Address Payor Phone Number Payor Fax Number Effective Dates    [address] [address] [phone]  10/1/2017 - None Entered    [facility] [id number]         Subscriber Name Subscriber Birth Date Member ID       KYLE SINGH 1955 [id number]               Secondary Coverage       Payor Plan Insurance Group Employer/Plan Group    [employer] HE[employer] CARE MEDICARE SUPP        Payor Plan Address Payor Plan Phone Number Payor Plan Fax Number Effective Dates    [address] [phone]  1/1/2021 - None Entered    [facility] [id number]         Subscriber Name Subscriber Birth Date Member ID       KYLE SINGH 1955 [id number]                      Electronically signed by Roberto Carlos Donnelly MD on 10/24/2024 at 12:40 PM.

## 2024-10-24 NOTE — PATIENT INSTRUCTIONS
effects of magnetic fields.  Note, not all models or variants of medical devices listed in the contraindications are affected by external magnetic fields. If you are not sure whether an implant or medical device falls under the contraindications, or you require additional information on the potential adverse effects of magnetic fields for your particular implant or medical device, please contact your physician/doctor.    Contact Information   Customers in the [place] with questions about this recall should contact [employer] at call [phone].

## 2024-11-18 DIAGNOSIS — E78.2 MIXED HYPERLIPIDEMIA: ICD-10-CM

## 2024-11-18 RX ORDER — ROSUVASTATIN CALCIUM 20 MG/1
20 TABLET, COATED ORAL DAILY
Qty: 90 TABLET | Refills: 3 | Status: SHIPPED | OUTPATIENT
Start: 2024-11-18 | End: 2025-05-17

## 2024-12-31 ENCOUNTER — TELEPHONE (OUTPATIENT)
Dept: PULMONOLOGY | Age: 69
End: 2024-12-31

## 2024-12-31 NOTE — TELEPHONE ENCOUNTER
I attempted to reach patient by phone but unsuccessfully. I will send them a cWyze message.  This is regarding his experience with CPAP (previous visit showed high air leak leading to high AHI).    Roberto Carlos Donnelly MD

## 2025-01-19 RX ORDER — LOSARTAN POTASSIUM 100 MG/1
100 TABLET ORAL DAILY
Qty: 30 TABLET | Refills: 5 | Status: SHIPPED | OUTPATIENT
Start: 2025-01-19

## 2025-02-24 RX ORDER — HYDROCHLOROTHIAZIDE 12.5 MG/1
12.5 CAPSULE ORAL 2 TIMES DAILY
Qty: 60 CAPSULE | Refills: 5 | OUTPATIENT
Start: 2025-02-24

## 2025-02-28 RX ORDER — HYDROCHLOROTHIAZIDE 12.5 MG/1
12.5 CAPSULE ORAL 2 TIMES DAILY
Qty: 60 CAPSULE | Refills: 5 | OUTPATIENT
Start: 2025-02-28

## 2025-03-10 RX ORDER — HYDROCHLOROTHIAZIDE 12.5 MG/1
12.5 CAPSULE ORAL 2 TIMES DAILY
Qty: 60 CAPSULE | Refills: 5 | Status: SHIPPED | OUTPATIENT
Start: 2025-03-10

## 2025-03-30 RX ORDER — VENLAFAXINE 75 MG/1
75 TABLET ORAL 3 TIMES DAILY
Qty: 90 TABLET | Refills: 5 | Status: SHIPPED | OUTPATIENT
Start: 2025-03-30

## 2025-04-17 ENCOUNTER — TELEPHONE (OUTPATIENT)
Dept: PULMONOLOGY | Age: 70
End: 2025-04-17

## 2025-04-17 NOTE — TELEPHONE ENCOUNTER
Pt states he is being charged $40 a month for his cpap and he is wondering why.    Left message with details for Belinda @ [facility] to check on this

## 2025-04-21 NOTE — TELEPHONE ENCOUNTER
Per Belinda, secondary insurance [employer] does not  what medicare doesn't cover until he meets his medicare deductible. Pt did sign explanation of benefits explaining this in August.     Left message for pt to return call @ [phone]

## 2025-05-13 ASSESSMENT — SLEEP AND FATIGUE QUESTIONNAIRES
HOW LIKELY ARE YOU TO NOD OFF OR FALL ASLEEP WHILE SITTING INACTIVE IN A PUBLIC PLACE: WOULD NEVER DOZE
HOW LIKELY ARE YOU TO NOD OFF OR FALL ASLEEP IN A CAR, WHILE STOPPED FOR A FEW MINUTES IN TRAFFIC: WOULD NEVER DOZE
HOW LIKELY ARE YOU TO NOD OFF OR FALL ASLEEP WHILE SITTING INACTIVE IN A PUBLIC PLACE: WOULD NEVER DOZE
HOW LIKELY ARE YOU TO NOD OFF OR FALL ASLEEP WHEN YOU ARE A PASSENGER IN A CAR FOR AN HOUR WITHOUT A BREAK: WOULD NEVER DOZE
HOW LIKELY ARE YOU TO NOD OFF OR FALL ASLEEP WHILE SITTING QUIETLY AFTER LUNCH WITHOUT ALCOHOL: WOULD NEVER DOZE
ESS TOTAL SCORE: 2
HOW LIKELY ARE YOU TO NOD OFF OR FALL ASLEEP WHILE SITTING AND TALKING TO SOMEONE: WOULD NEVER DOZE
HOW LIKELY ARE YOU TO NOD OFF OR FALL ASLEEP WHILE WATCHING TV: WOULD NEVER DOZE
HOW LIKELY ARE YOU TO NOD OFF OR FALL ASLEEP WHILE LYING DOWN TO REST IN THE AFTERNOON WHEN CIRCUMSTANCES PERMIT: SLIGHT CHANCE OF DOZING
HOW LIKELY ARE YOU TO NOD OFF OR FALL ASLEEP WHILE WATCHING TV: WOULD NEVER DOZE
HOW LIKELY ARE YOU TO NOD OFF OR FALL ASLEEP WHILE SITTING AND READING: SLIGHT CHANCE OF DOZING
HOW LIKELY ARE YOU TO NOD OFF OR FALL ASLEEP WHILE SITTING AND TALKING TO SOMEONE: WOULD NEVER DOZE
HOW LIKELY ARE YOU TO NOD OFF OR FALL ASLEEP IN A CAR, WHILE STOPPED FOR A FEW MINUTES IN TRAFFIC: WOULD NEVER DOZE
HOW LIKELY ARE YOU TO NOD OFF OR FALL ASLEEP WHILE LYING DOWN TO REST IN THE AFTERNOON WHEN CIRCUMSTANCES PERMIT: SLIGHT CHANCE OF DOZING
HOW LIKELY ARE YOU TO NOD OFF OR FALL ASLEEP WHILE SITTING AND READING: SLIGHT CHANCE OF DOZING
HOW LIKELY ARE YOU TO NOD OFF OR FALL ASLEEP WHILE SITTING QUIETLY AFTER LUNCH WITHOUT ALCOHOL: WOULD NEVER DOZE
HOW LIKELY ARE YOU TO NOD OFF OR FALL ASLEEP WHEN YOU ARE A PASSENGER IN A CAR FOR AN HOUR WITHOUT A BREAK: WOULD NEVER DOZE

## 2025-05-14 ENCOUNTER — OFFICE VISIT (OUTPATIENT)
Dept: PULMONOLOGY | Age: 70
End: 2025-05-14
Payer: MEDICARE

## 2025-05-14 VITALS
OXYGEN SATURATION: 97 % | SYSTOLIC BLOOD PRESSURE: 136 MMHG | HEIGHT: 73 IN | WEIGHT: 307.6 LBS | BODY MASS INDEX: 40.77 KG/M2 | HEART RATE: 82 BPM | DIASTOLIC BLOOD PRESSURE: 82 MMHG

## 2025-05-14 DIAGNOSIS — E66.01 MORBID OBESITY WITH BMI OF 40.0-44.9, ADULT (HCC): ICD-10-CM

## 2025-05-14 DIAGNOSIS — I10 ESSENTIAL HYPERTENSION: ICD-10-CM

## 2025-05-14 DIAGNOSIS — G47.33 OSA ON CPAP: Primary | ICD-10-CM

## 2025-05-14 PROCEDURE — 1123F ACP DISCUSS/DSCN MKR DOCD: CPT | Performed by: STUDENT IN AN ORGANIZED HEALTH CARE EDUCATION/TRAINING PROGRAM

## 2025-05-14 PROCEDURE — 99214 OFFICE O/P EST MOD 30 MIN: CPT | Performed by: STUDENT IN AN ORGANIZED HEALTH CARE EDUCATION/TRAINING PROGRAM

## 2025-05-14 PROCEDURE — 4004F PT TOBACCO SCREEN RCVD TLK: CPT | Performed by: STUDENT IN AN ORGANIZED HEALTH CARE EDUCATION/TRAINING PROGRAM

## 2025-05-14 PROCEDURE — G8427 DOCREV CUR MEDS BY ELIG CLIN: HCPCS | Performed by: STUDENT IN AN ORGANIZED HEALTH CARE EDUCATION/TRAINING PROGRAM

## 2025-05-14 PROCEDURE — 3017F COLORECTAL CA SCREEN DOC REV: CPT | Performed by: STUDENT IN AN ORGANIZED HEALTH CARE EDUCATION/TRAINING PROGRAM

## 2025-05-14 PROCEDURE — 3075F SYST BP GE 130 - 139MM HG: CPT | Performed by: STUDENT IN AN ORGANIZED HEALTH CARE EDUCATION/TRAINING PROGRAM

## 2025-05-14 PROCEDURE — 1159F MED LIST DOCD IN RCRD: CPT | Performed by: STUDENT IN AN ORGANIZED HEALTH CARE EDUCATION/TRAINING PROGRAM

## 2025-05-14 PROCEDURE — 3079F DIAST BP 80-89 MM HG: CPT | Performed by: STUDENT IN AN ORGANIZED HEALTH CARE EDUCATION/TRAINING PROGRAM

## 2025-05-14 PROCEDURE — G2211 COMPLEX E/M VISIT ADD ON: HCPCS | Performed by: STUDENT IN AN ORGANIZED HEALTH CARE EDUCATION/TRAINING PROGRAM

## 2025-05-14 PROCEDURE — G8417 CALC BMI ABV UP PARAM F/U: HCPCS | Performed by: STUDENT IN AN ORGANIZED HEALTH CARE EDUCATION/TRAINING PROGRAM

## 2025-05-14 NOTE — PATIENT INSTRUCTIONS
RAYNA education:    You have sleep apnea:      Untreated sleep apnea is associated with increased risk of hypertension, cardiac disease, myocardial infarction, stroke, and poor blood sugar control. Treating your RAYNA can decrease these risks.    Weight loss does improve sleep apnea. It is important to have a healthy diet and an exercise program.     Alcohol and sedating medicine can make sleep apnea worse and should be avoided in particular before sleep.    If you have surgery or are hospitalized, tell your doctor that you have sleep apnea and bring your CPAP to the hospital.    If you are drowsy or sleepy, you should not drive. If you are driving and become drowsy or sleepy, you should pull over to a safe place. Below are our drowsy driving tips.     PAP machine care:   You should clean your PAP regularly (see your PAP  site for more details)  Daily cleaning   1. Wipe mask with a damp towel with mild detergent, rinse with a damp towel and let air dry. You can also see CPAP cleaning wipes. Avoid harsh cleaning wipes or chemicals.    2. Humidifier- empty water daily. Fill with clean distilled water before use before sleep.    Weekly Cleaning   1. Clean mask, headgear, and tubing weekly  2. Fill your sink with warm water and a few drops of mild dish soap. Swirl equipment for at least 5 minutes. Rinse well and air dry. Hang hose over something so the water droplets drip out.   3. Clean filter- rinse with warm water, squeeze excess water and blot dry with towel. If there is a white filter (respironics machine)- do not wash that - it is disposable and should be changed once a month.   4. Humidifier- Disinfect in solution that is one part vinegar and 5 parts water for 30 min. You can also put it in the top rack of  to clean.     Ask your medical equipment company about renewal of your supplies. At the very least- this should be done once a year.       Weight Loss  Weight Loss is an important part of

## 2025-05-14 NOTE — PROGRESS NOTES
[facility]  Sleep Medicine  [facility] [facility], [address]  [place] [address]    Chief Complaint   Patient presents with    Sleep Apnea         Kyle Singh comes in today for sleep apnea and insomnia follow-up . He was diagnosed with severe obstructive sleep apnea and is being treated with PAP therapy.   He has been on PAP therapy for several years.  He states he wears the PAP device every night.     He falls asleep in 1 hour.  He awakens several times per night (usually to use the bathroom). The average total amount of sleep is about 6-7 hours per night and takes no naps. He does not use sleep aid/s (in the past he has only tried OTC sleep meds). Symptoms of sleep apnea have improved since using PAP therapy.  He continues to have complaints of disrupted sleep.  He is not snoring with the machine on.  He denies any complaints of too high pressure, hunger for air, dry mouth / nose, mask fit / discomfort issues, low air humidity, high air humidity, and temperature issues.  He does complain of high/frequent leaks.   DME: [employer]  Mask: AirFit F20 medium  Machine: ResMed Airsense 11 Autoset    Insomnia: Patient has lots of sleep disruption, mostly due to his mind racing.  Is not in favor of taking medications at this time.    DATA REVIEWED TODAY:    Diagnostic Review  HST on 7/29/2024 showed respiratory event index of 35.1/h with oxygen desaturation down to a ray of 86%.     [place]           5/13/2025     7:21 AM 10/23/2024     1:29 PM 7/13/2024     9:23 AM   Sleep Medicine   Sitting and reading 1 0 1   Watching TV 0 0 1   Sitting, inactive in a public place (e.g. a theatre or a meeting) 0 0 0   As a passenger in a car for an hour without a break 0 0 1   Lying down to rest in the afternoon when circumstances permit 1 1 2   Sitting and talking to someone 0 0 0   Sitting quietly after a lunch without alcohol 0 1 1   In a car, while stopped for a few minutes in traffic 0 0 0   [place]

## 2025-05-15 DIAGNOSIS — Z12.11 COLON CANCER SCREENING: Primary | ICD-10-CM

## 2025-06-03 ENCOUNTER — RESULTS FOLLOW-UP (OUTPATIENT)
Dept: FAMILY MEDICINE CLINIC | Age: 70
End: 2025-06-03

## 2025-06-03 LAB — NONINV COLON CA DNA+OCC BLD SCRN STL QL: NEGATIVE

## 2025-07-21 RX ORDER — LOSARTAN POTASSIUM 100 MG/1
100 TABLET ORAL DAILY
Qty: 30 TABLET | Refills: 5 | Status: SHIPPED | OUTPATIENT
Start: 2025-07-21

## 2025-07-28 ENCOUNTER — TELEPHONE (OUTPATIENT)
Dept: FAMILY MEDICINE CLINIC | Age: 70
End: 2025-07-28

## 2025-07-28 DIAGNOSIS — E78.2 MIXED HYPERLIPIDEMIA: ICD-10-CM

## 2025-07-28 DIAGNOSIS — I10 ESSENTIAL HYPERTENSION: ICD-10-CM

## 2025-07-28 DIAGNOSIS — Z12.5 SCREENING FOR MALIGNANT NEOPLASM OF PROSTATE: Primary | ICD-10-CM

## 2025-07-28 DIAGNOSIS — E83.19 INCREASED STORAGE IRON: ICD-10-CM

## 2025-08-08 RX ORDER — AMLODIPINE BESYLATE 5 MG/1
5 TABLET ORAL DAILY
Qty: 30 TABLET | Refills: 0 | Status: SHIPPED | OUTPATIENT
Start: 2025-08-08

## 2025-08-11 DIAGNOSIS — E83.19 INCREASED STORAGE IRON: ICD-10-CM

## 2025-08-11 DIAGNOSIS — Z12.5 SCREENING FOR MALIGNANT NEOPLASM OF PROSTATE: ICD-10-CM

## 2025-08-11 DIAGNOSIS — I10 ESSENTIAL HYPERTENSION: ICD-10-CM

## 2025-08-11 DIAGNOSIS — E78.2 MIXED HYPERLIPIDEMIA: ICD-10-CM

## 2025-08-11 LAB
ALBUMIN SERPL-MCNC: 4.6 G/DL (ref 3.4–5)
ALBUMIN/GLOB SERPL: 1.8 {RATIO} (ref 1.1–2.2)
ALP SERPL-CCNC: 47 U/L (ref 40–129)
ALT SERPL-CCNC: 66 U/L (ref 10–40)
ANION GAP SERPL CALCULATED.3IONS-SCNC: 14 MMOL/L (ref 3–16)
AST SERPL-CCNC: 59 U/L (ref 15–37)
BASOPHILS # BLD: 0.1 K/UL (ref 0–0.2)
BASOPHILS NFR BLD: 0.9 %
BILIRUB SERPL-MCNC: 1 MG/DL (ref 0–1)
BUN SERPL-MCNC: 13 MG/DL (ref 7–20)
CALCIUM SERPL-MCNC: 9.5 MG/DL (ref 8.3–10.6)
CHLORIDE SERPL-SCNC: 97 MMOL/L (ref 99–110)
CHOLEST SERPL-MCNC: 167 MG/DL (ref 0–199)
CO2 SERPL-SCNC: 24 MMOL/L (ref 21–32)
CREAT SERPL-MCNC: 1 MG/DL (ref 0.8–1.3)
DEPRECATED RDW RBC AUTO: 13.8 % (ref 12.4–15.4)
EOSINOPHIL # BLD: 0.2 K/UL (ref 0–0.6)
EOSINOPHIL NFR BLD: 2.5 %
FERRITIN SERPL IA-MCNC: 509 NG/ML (ref 30–400)
GFR SERPLBLD CREATININE-BSD FMLA CKD-EPI: 81 ML/MIN/{1.73_M2}
GLUCOSE SERPL-MCNC: 127 MG/DL (ref 70–99)
HCT VFR BLD AUTO: 48.1 % (ref 40.5–52.5)
HDLC SERPL-MCNC: 71 MG/DL (ref 40–60)
HGB BLD-MCNC: 16.6 G/DL (ref 13.5–17.5)
IRON SATN MFR SERPL: 46 % (ref 20–50)
IRON SERPL-MCNC: 129 UG/DL (ref 59–158)
LDLC SERPL CALC-MCNC: 85 MG/DL
LYMPHOCYTES # BLD: 1.3 K/UL (ref 1–5.1)
LYMPHOCYTES NFR BLD: 14.9 %
MCH RBC QN AUTO: 32.9 PG (ref 26–34)
MCHC RBC AUTO-ENTMCNC: 34.5 G/DL (ref 31–36)
MCV RBC AUTO: 95.2 FL (ref 80–100)
MONOCYTES # BLD: 0.8 K/UL (ref 0–1.3)
MONOCYTES NFR BLD: 9.2 %
NEUTROPHILS # BLD: 6.3 K/UL (ref 1.7–7.7)
NEUTROPHILS NFR BLD: 72.5 %
PLATELET # BLD AUTO: 180 K/UL (ref 135–450)
PMV BLD AUTO: 9.2 FL (ref 5–10.5)
POTASSIUM SERPL-SCNC: 3.8 MMOL/L (ref 3.5–5.1)
PROT SERPL-MCNC: 7.1 G/DL (ref 6.4–8.2)
PSA SERPL DL<=0.01 NG/ML-MCNC: 0.91 NG/ML (ref 0–4)
RBC # BLD AUTO: 5.05 M/UL (ref 4.2–5.9)
SODIUM SERPL-SCNC: 135 MMOL/L (ref 136–145)
TIBC SERPL-MCNC: 283 UG/DL (ref 260–445)
TRIGL SERPL-MCNC: 54 MG/DL (ref 0–150)
VLDLC SERPL CALC-MCNC: 11 MG/DL
WBC # BLD AUTO: 8.7 K/UL (ref 4–11)

## 2025-08-12 SDOH — ECONOMIC STABILITY: FOOD INSECURITY: WITHIN THE PAST 12 MONTHS, THE FOOD YOU BOUGHT JUST DIDN'T LAST AND YOU DIDN'T HAVE MONEY TO GET MORE.: NEVER TRUE

## 2025-08-12 SDOH — ECONOMIC STABILITY: INCOME INSECURITY: IN THE LAST 12 MONTHS, WAS THERE A TIME WHEN YOU WERE NOT ABLE TO PAY THE MORTGAGE OR RENT ON TIME?: NO

## 2025-08-12 SDOH — ECONOMIC STABILITY: FOOD INSECURITY: WITHIN THE PAST 12 MONTHS, YOU WORRIED THAT YOUR FOOD WOULD RUN OUT BEFORE YOU GOT MONEY TO BUY MORE.: NEVER TRUE

## 2025-08-12 SDOH — HEALTH STABILITY: PHYSICAL HEALTH: ON AVERAGE, HOW MANY DAYS PER WEEK DO YOU ENGAGE IN MODERATE TO STRENUOUS EXERCISE (LIKE A BRISK WALK)?: 0 DAYS

## 2025-08-12 SDOH — ECONOMIC STABILITY: TRANSPORTATION INSECURITY
IN THE PAST 12 MONTHS, HAS THE LACK OF TRANSPORTATION KEPT YOU FROM MEDICAL APPOINTMENTS OR FROM GETTING MEDICATIONS?: NO

## 2025-08-12 ASSESSMENT — LIFESTYLE VARIABLES
HOW OFTEN DURING THE LAST YEAR HAVE YOU HAD A FEELING OF GUILT OR REMORSE AFTER DRINKING: NEVER
HOW OFTEN DURING THE LAST YEAR HAVE YOU FOUND THAT YOU WERE NOT ABLE TO STOP DRINKING ONCE YOU HAD STARTED: NEVER
HAVE YOU OR SOMEONE ELSE BEEN INJURED AS A RESULT OF YOUR DRINKING: NO
HOW OFTEN DURING THE LAST YEAR HAVE YOU HAD A FEELING OF GUILT OR REMORSE AFTER DRINKING: NEVER
HOW OFTEN DO YOU HAVE A DRINK CONTAINING ALCOHOL: 98
HAS A RELATIVE, FRIEND, DOCTOR, OR ANOTHER HEALTH PROFESSIONAL EXPRESSED CONCERN ABOUT YOUR DRINKING OR SUGGESTED YOU CUT DOWN: YES, BUT NOT IN THE PAST YEAR
HAS A RELATIVE, FRIEND, DOCTOR, OR ANOTHER HEALTH PROFESSIONAL EXPRESSED CONCERN ABOUT YOUR DRINKING OR SUGGESTED YOU CUT DOWN: YES, BUT NOT IN THE PAST YEAR
HAVE YOU OR SOMEONE ELSE BEEN INJURED AS A RESULT OF YOUR DRINKING: NO
HOW OFTEN DURING THE LAST YEAR HAVE YOU BEEN UNABLE TO REMEMBER WHAT HAPPENED THE NIGHT BEFORE BECAUSE YOU HAD BEEN DRINKING: NEVER
HOW OFTEN DO YOU HAVE SIX OR MORE DRINKS ON ONE OCCASION: 5
HOW OFTEN DURING THE LAST YEAR HAVE YOU NEEDED AN ALCOHOLIC DRINK FIRST THING IN THE MORNING TO GET YOURSELF GOING AFTER A NIGHT OF HEAVY DRINKING: NEVER
HOW OFTEN DO YOU HAVE A DRINK CONTAINING ALCOHOL: PATIENT DECLINED
HOW MANY STANDARD DRINKS CONTAINING ALCOHOL DO YOU HAVE ON A TYPICAL DAY: 2
HOW OFTEN DURING THE LAST YEAR HAVE YOU BEEN UNABLE TO REMEMBER WHAT HAPPENED THE NIGHT BEFORE BECAUSE YOU HAD BEEN DRINKING: NEVER
HOW MANY STANDARD DRINKS CONTAINING ALCOHOL DO YOU HAVE ON A TYPICAL DAY: 3 OR 4
HOW OFTEN DURING THE LAST YEAR HAVE YOU FAILED TO DO WHAT WAS NORMALLY EXPECTED FROM YOU BECAUSE OF DRINKING: NEVER
HOW OFTEN DURING THE LAST YEAR HAVE YOU NEEDED AN ALCOHOLIC DRINK FIRST THING IN THE MORNING TO GET YOURSELF GOING AFTER A NIGHT OF HEAVY DRINKING: NEVER
HOW OFTEN DURING THE LAST YEAR HAVE YOU FAILED TO DO WHAT WAS NORMALLY EXPECTED FROM YOU BECAUSE OF DRINKING: NEVER
HOW OFTEN DURING THE LAST YEAR HAVE YOU FOUND THAT YOU WERE NOT ABLE TO STOP DRINKING ONCE YOU HAD STARTED: NEVER

## 2025-08-12 ASSESSMENT — PATIENT HEALTH QUESTIONNAIRE - PHQ9
SUM OF ALL RESPONSES TO PHQ QUESTIONS 1-9: 0
SUM OF ALL RESPONSES TO PHQ QUESTIONS 1-9: 0
2. FEELING DOWN, DEPRESSED OR HOPELESS: NOT AT ALL
1. LITTLE INTEREST OR PLEASURE IN DOING THINGS: NOT AT ALL
SUM OF ALL RESPONSES TO PHQ QUESTIONS 1-9: 0
SUM OF ALL RESPONSES TO PHQ QUESTIONS 1-9: 0

## 2025-08-14 ENCOUNTER — OFFICE VISIT (OUTPATIENT)
Dept: FAMILY MEDICINE CLINIC | Age: 70
End: 2025-08-14

## 2025-08-14 VITALS
BODY MASS INDEX: 39.89 KG/M2 | HEART RATE: 84 BPM | DIASTOLIC BLOOD PRESSURE: 82 MMHG | WEIGHT: 301 LBS | TEMPERATURE: 97.8 F | SYSTOLIC BLOOD PRESSURE: 136 MMHG | HEIGHT: 73 IN | OXYGEN SATURATION: 97 %

## 2025-08-14 DIAGNOSIS — Z00.00 MEDICARE ANNUAL WELLNESS VISIT, SUBSEQUENT: Primary | ICD-10-CM

## 2025-08-14 DIAGNOSIS — I48.91 ATRIAL FIBRILLATION, UNSPECIFIED TYPE (HCC): ICD-10-CM

## 2025-08-14 DIAGNOSIS — Z87.891 PERSONAL HISTORY OF TOBACCO USE: ICD-10-CM

## 2025-08-14 DIAGNOSIS — I10 ESSENTIAL HYPERTENSION: ICD-10-CM

## 2025-08-14 RX ORDER — METOPROLOL SUCCINATE 25 MG/1
25 TABLET, EXTENDED RELEASE ORAL DAILY
Qty: 30 TABLET | Refills: 0 | Status: SHIPPED | OUTPATIENT
Start: 2025-08-14

## 2025-08-14 ASSESSMENT — PATIENT HEALTH QUESTIONNAIRE - PHQ9
9. THOUGHTS THAT YOU WOULD BE BETTER OFF DEAD, OR OF HURTING YOURSELF: NOT AT ALL
SUM OF ALL RESPONSES TO PHQ QUESTIONS 1-9: 5
SUM OF ALL RESPONSES TO PHQ QUESTIONS 1-9: 5
10. IF YOU CHECKED OFF ANY PROBLEMS, HOW DIFFICULT HAVE THESE PROBLEMS MADE IT FOR YOU TO DO YOUR WORK, TAKE CARE OF THINGS AT HOME, OR GET ALONG WITH OTHER PEOPLE: NOT DIFFICULT AT ALL
2. FEELING DOWN, DEPRESSED OR HOPELESS: SEVERAL DAYS
1. LITTLE INTEREST OR PLEASURE IN DOING THINGS: SEVERAL DAYS
SUM OF ALL RESPONSES TO PHQ QUESTIONS 1-9: 5
5. POOR APPETITE OR OVEREATING: SEVERAL DAYS
3. TROUBLE FALLING OR STAYING ASLEEP: SEVERAL DAYS
6. FEELING BAD ABOUT YOURSELF - OR THAT YOU ARE A FAILURE OR HAVE LET YOURSELF OR YOUR FAMILY DOWN: NOT AT ALL
7. TROUBLE CONCENTRATING ON THINGS, SUCH AS READING THE NEWSPAPER OR WATCHING TELEVISION: NOT AT ALL
SUM OF ALL RESPONSES TO PHQ QUESTIONS 1-9: 5
8. MOVING OR SPEAKING SO SLOWLY THAT OTHER PEOPLE COULD HAVE NOTICED. OR THE OPPOSITE, BEING SO FIGETY OR RESTLESS THAT YOU HAVE BEEN MOVING AROUND A LOT MORE THAN USUAL: NOT AT ALL
4. FEELING TIRED OR HAVING LITTLE ENERGY: SEVERAL DAYS

## 2025-08-20 ENCOUNTER — TELEPHONE (OUTPATIENT)
Dept: ADMINISTRATIVE | Age: 70
End: 2025-08-20